# Patient Record
Sex: FEMALE | Race: WHITE | NOT HISPANIC OR LATINO | Employment: FULL TIME | URBAN - METROPOLITAN AREA
[De-identification: names, ages, dates, MRNs, and addresses within clinical notes are randomized per-mention and may not be internally consistent; named-entity substitution may affect disease eponyms.]

---

## 2017-11-24 ENCOUNTER — THERAPY VISIT (OUTPATIENT)
Dept: PHYSICAL THERAPY | Facility: CLINIC | Age: 27
End: 2017-11-24
Payer: COMMERCIAL

## 2017-11-24 DIAGNOSIS — M25.562 BILATERAL KNEE PAIN: Primary | ICD-10-CM

## 2017-11-24 DIAGNOSIS — M25.561 BILATERAL KNEE PAIN: Primary | ICD-10-CM

## 2017-11-24 PROCEDURE — 97110 THERAPEUTIC EXERCISES: CPT | Mod: GP | Performed by: PHYSICAL THERAPIST

## 2017-11-24 PROCEDURE — 97161 PT EVAL LOW COMPLEX 20 MIN: CPT | Mod: GP | Performed by: PHYSICAL THERAPIST

## 2017-11-24 ASSESSMENT — ACTIVITIES OF DAILY LIVING (ADL)
RISE FROM A CHAIR: NOT ANSWERED
PAIN: THE SYMPTOM AFFECTS MY ACTIVITY SEVERELY
KNEEL ON THE FRONT OF YOUR KNEE: NOT ANSWERED
KNEE_ACTIVITY_OF_DAILY_LIVING_SUM: 18
SWELLING: THE SYMPTOM AFFECTS MY ACTIVITY SLIGHTLY
STAND: NOT ANSWERED
GO DOWN STAIRS: NOT ANSWERED
HOW_WOULD_YOU_RATE_THE_OVERALL_FUNCTION_OF_YOUR_KNEE_DURING_YOUR_USUAL_DAILY_ACTIVITIES?: NOT ANSWERED
AS_A_RESULT_OF_YOUR_KNEE_INJURY,_HOW_WOULD_YOU_RATE_YOUR_CURRENT_LEVEL_OF_DAILY_ACTIVITY?: NOT ANSWERED
WALK: NOT ANSWERED
WEAKNESS: I HAVE THE SYMPTOM BUT IT DOES NOT AFFECT MY ACTIVITY
GO UP STAIRS: NOT ANSWERED
LIMPING: I HAVE THE SYMPTOM BUT IT DOES NOT AFFECT MY ACTIVITY
SQUAT: NOT ANSWERED
STIFFNESS: I HAVE THE SYMPTOM BUT IT DOES NOT AFFECT MY ACTIVITY
SIT WITH YOUR KNEE BENT: NOT ANSWERED
GIVING WAY, BUCKLING OR SHIFTING OF KNEE: THE SYMPTOM AFFECTS MY ACTIVITY SLIGHTLY
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
## 2017-11-24 NOTE — MR AVS SNAPSHOT
"              After Visit Summary   11/24/2017    Arabella Friend    MRN: [id number]           Patient Information     Date Of Birth          1990        Visit Information        Provider Department      11/24/2017 5:20 PM Lee Zurita PT [facility]        Today's Diagnoses     Bilateral knee pain    -  1       Follow-ups after your visit        Your next 10 appointments already scheduled     Dec 01, 2017  5:20 PM CST   JORGE Extremity with Lee Zurita PT   [facility] ([facility]  )    [address] [place] [address] [phone]            Dec 08, 2017  5:20 PM CST   JORGE Extremity with Lee Zurita PT   [facility] ([address] [address] [phone]              Who to contact     If you have questions or need follow up information about today's clinic visit or your schedule please contact [facility] directly at [phone].  Normal or non-critical lab and imaging results will be communicated to you by [employer], letter or phone within 4 business days after the clinic has received the results. If you do not hear from us within 7 days, please contact the clinic through [employer] or phone. If you have a critical or abnormal lab result, we will notify you by phone as soon as possible.  Submit refill requests through [employer] or call your pharmacy and they will forward the refill request to us. Please allow 3 business days for your refill to be completed.          Additional Information About Your Visit        Shoptiqueshart Information     [employer] lets you send messages to your doctor, view your test results, renew your prescriptions, schedule appointments and more. To sign up, go to [email or web address] . Click on \"Log in\" on the left side of the " "screen, which will take you to the Welcome page. Then click on \"Sign up Now\" on the right side of the page.     You will be asked to enter the access code listed below, as well as some personal information. Please follow the directions to create your username and password.     Your access code is: [email or web address]  Expires: 2018  5:53 PM     Your access code will  in 90 days. If you need help or a new code, please call your [place] clinic or [phone].        Care EveryWhere ID     This is your Care EveryWhere ID. This could be used by other organizations to access your [place] medical records  [id number]         Blood Pressure from Last 3 Encounters:   No data found for BP    Weight from Last 3 Encounters:   No data found for Wt              We Performed the Following     HC PT EVAL, LOW COMPLEXITY     JORGE INITIAL EVAL REPORT     THERAPEUTIC EXERCISES        Primary Care Provider    None Specified       No primary provider on file.        Equal Access to Services     [facility]: Hadii peewee Garsia, wagerardo hay, qasanchezta kaalmada rich, edwar napier . So [facility] [phone].    ATENCIÓN: Si habla español, tiene a cheung disposición servicios gratuitos de asistencia lingüística. Llame al [phone].    We comply with applicable federal civil rights laws and [place] laws. We do not discriminate on the basis of race, color, national origin, age, disability, sex, sexual orientation, or gender identity.            Thank you!     Thank you for choosing [facility] THERAP[facility]  for your care. Our goal is always to provide you with excellent care. Hearing back from our patients is one way we can continue to improve our services. Please take a few minutes to complete the written survey that you may receive in the mail after your visit with us. Thank you!             Your Updated Medication List - Protect others around you: Learn " how to safely use, store and throw away your medicines at www.disposemymeds.org.      Notice  As of 11/24/2017  5:53 PM    You have not been prescribed any medications.

## 2017-12-01 ENCOUNTER — THERAPY VISIT (OUTPATIENT)
Dept: PHYSICAL THERAPY | Facility: CLINIC | Age: 27
End: 2017-12-01
Payer: COMMERCIAL

## 2017-12-01 DIAGNOSIS — M25.561 BILATERAL KNEE PAIN: Primary | ICD-10-CM

## 2017-12-01 DIAGNOSIS — M25.562 BILATERAL KNEE PAIN: Primary | ICD-10-CM

## 2017-12-01 PROCEDURE — 97110 THERAPEUTIC EXERCISES: CPT | Mod: GP | Performed by: PHYSICAL THERAPIST

## 2017-12-01 PROCEDURE — 97112 NEUROMUSCULAR REEDUCATION: CPT | Mod: GP | Performed by: PHYSICAL THERAPIST

## 2017-12-01 PROCEDURE — 97140 MANUAL THERAPY 1/> REGIONS: CPT | Mod: GP | Performed by: PHYSICAL THERAPIST

## 2017-12-26 ENCOUNTER — THERAPY VISIT (OUTPATIENT)
Dept: PHYSICAL THERAPY | Facility: CLINIC | Age: 27
End: 2017-12-26
Payer: COMMERCIAL

## 2017-12-26 DIAGNOSIS — M25.562 BILATERAL KNEE PAIN: ICD-10-CM

## 2017-12-26 DIAGNOSIS — M25.561 BILATERAL KNEE PAIN: ICD-10-CM

## 2017-12-26 PROCEDURE — 97110 THERAPEUTIC EXERCISES: CPT | Mod: GP | Performed by: PHYSICAL THERAPIST

## 2018-01-12 ENCOUNTER — THERAPY VISIT (OUTPATIENT)
Dept: PHYSICAL THERAPY | Facility: CLINIC | Age: 28
End: 2018-01-12
Payer: COMMERCIAL

## 2018-01-12 DIAGNOSIS — M25.561 BILATERAL KNEE PAIN: ICD-10-CM

## 2018-01-12 DIAGNOSIS — M25.562 BILATERAL KNEE PAIN: ICD-10-CM

## 2018-01-12 PROCEDURE — 97112 NEUROMUSCULAR REEDUCATION: CPT | Mod: GP | Performed by: PHYSICAL THERAPIST

## 2018-01-12 PROCEDURE — 97110 THERAPEUTIC EXERCISES: CPT | Mod: GP | Performed by: PHYSICAL THERAPIST

## 2018-03-02 ENCOUNTER — THERAPY VISIT (OUTPATIENT)
Dept: PHYSICAL THERAPY | Facility: CLINIC | Age: 28
End: 2018-03-02
Payer: COMMERCIAL

## 2018-03-02 DIAGNOSIS — M25.561 BILATERAL KNEE PAIN: ICD-10-CM

## 2018-03-02 DIAGNOSIS — M25.562 BILATERAL KNEE PAIN: ICD-10-CM

## 2018-03-02 PROCEDURE — 97112 NEUROMUSCULAR REEDUCATION: CPT | Mod: GP | Performed by: PHYSICAL THERAPIST

## 2018-03-02 PROCEDURE — 97110 THERAPEUTIC EXERCISES: CPT | Mod: GP | Performed by: PHYSICAL THERAPIST

## 2020-11-17 ENCOUNTER — HOSPITAL ENCOUNTER (EMERGENCY)
Facility: CLINIC | Age: 30
Discharge: HOME | End: 2020-11-17
Attending: EMERGENCY MEDICINE | Admitting: EMERGENCY MEDICINE
Payer: OTHER MISCELLANEOUS

## 2020-11-17 ENCOUNTER — APPOINTMENT (OUTPATIENT)
Dept: GENERAL RADIOLOGY | Facility: CLINIC | Age: 30
End: 2020-11-17
Attending: EMERGENCY MEDICINE
Payer: OTHER MISCELLANEOUS

## 2020-11-17 VITALS
TEMPERATURE: 98 F | BODY MASS INDEX: 33.32 KG/M2 | DIASTOLIC BLOOD PRESSURE: 91 MMHG | OXYGEN SATURATION: 98 % | RESPIRATION RATE: 20 BRPM | WEIGHT: 200 LBS | SYSTOLIC BLOOD PRESSURE: 159 MMHG | HEIGHT: 65 IN | HEART RATE: 134 BPM

## 2020-11-17 DIAGNOSIS — T59.811A SMOKE INHALATION: ICD-10-CM

## 2020-11-17 DIAGNOSIS — T23.209A PARTIAL THICKNESS BURN OF HAND, UNSPECIFIED LATERALITY, UNSPECIFIED SITE OF HAND, INITIAL ENCOUNTER: ICD-10-CM

## 2020-11-17 PROCEDURE — 99283 EMERGENCY DEPT VISIT LOW MDM: CPT | Mod: 25

## 2020-11-17 PROCEDURE — 250N000013 HC RX MED GY IP 250 OP 250 PS 637: Performed by: EMERGENCY MEDICINE

## 2020-11-17 PROCEDURE — 250N000009 HC RX 250: Performed by: EMERGENCY MEDICINE

## 2020-11-17 PROCEDURE — 71046 X-RAY EXAM CHEST 2 VIEWS: CPT

## 2020-11-17 RX ORDER — IBUPROFEN 600 MG/1
600 TABLET, FILM COATED ORAL ONCE
Status: COMPLETED | OUTPATIENT
Start: 2020-11-17 | End: 2020-11-17

## 2020-11-17 RX ORDER — ALBUTEROL SULFATE 90 UG/1
2 POWDER, METERED RESPIRATORY (INHALATION) EVERY 6 HOURS PRN
Qty: 1 INHALER | Refills: 0 | Status: SHIPPED | OUTPATIENT
Start: 2020-11-17 | End: 2022-12-15

## 2020-11-17 RX ORDER — SILVER SULFADIAZINE 10 MG/G
1 CREAM TOPICAL DAILY
Status: DISCONTINUED | OUTPATIENT
Start: 2020-11-17 | End: 2020-11-17 | Stop reason: HOSPADM

## 2020-11-17 RX ORDER — SILVER SULFADIAZINE 10 MG/G
CREAM TOPICAL
Qty: 400 G | Refills: 1 | Status: SHIPPED | OUTPATIENT
Start: 2020-11-17 | End: 2022-12-15

## 2020-11-17 RX ADMIN — IBUPROFEN 600 MG: 600 TABLET ORAL at 14:14

## 2020-11-17 RX ADMIN — SILVER SULFADIAZINE 1 G: 10 CREAM TOPICAL at 14:29

## 2020-11-17 ASSESSMENT — ENCOUNTER SYMPTOMS: COUGH: 1

## 2020-11-17 ASSESSMENT — MIFFLIN-ST. JEOR: SCORE: 1633.07
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
## 2020-12-20 ENCOUNTER — HEALTH MAINTENANCE LETTER (OUTPATIENT)
Age: 30
End: 2020-12-20

## 2021-10-03 ENCOUNTER — HEALTH MAINTENANCE LETTER (OUTPATIENT)
Age: 31
End: 2021-10-03

## 2022-01-13 ENCOUNTER — OFFICE VISIT (OUTPATIENT)
Dept: URGENT CARE | Facility: URGENT CARE | Age: 32
End: 2022-01-13
Payer: COMMERCIAL

## 2022-01-13 VITALS
HEART RATE: 102 BPM | TEMPERATURE: 99.2 F | BODY MASS INDEX: 34.31 KG/M2 | WEIGHT: 206.2 LBS | DIASTOLIC BLOOD PRESSURE: 71 MMHG | OXYGEN SATURATION: 97 % | SYSTOLIC BLOOD PRESSURE: 107 MMHG

## 2022-01-13 DIAGNOSIS — J01.90 ACUTE SINUSITIS WITH SYMPTOMS > 10 DAYS: Primary | ICD-10-CM

## 2022-01-13 PROCEDURE — 99203 OFFICE O/P NEW LOW 30 MIN: CPT | Mod: 25 | Performed by: PHYSICIAN ASSISTANT

## 2022-01-13 PROCEDURE — 96372 THER/PROPH/DIAG INJ SC/IM: CPT | Performed by: PHYSICIAN ASSISTANT

## 2022-01-13 RX ORDER — ETONOGESTREL AND ETHINYL ESTRADIOL VAGINAL RING .015; .12 MG/D; MG/D
1 RING VAGINAL EVERY 24 HOURS
COMMUNITY
End: 2022-01-13

## 2022-01-13 RX ORDER — CEFTRIAXONE SODIUM 1 G
2 VIAL (EA) INJECTION ONCE
Status: COMPLETED | OUTPATIENT
Start: 2022-01-13 | End: 2022-01-13

## 2022-01-13 RX ORDER — SERTRALINE HYDROCHLORIDE 100 MG/1
100 TABLET, FILM COATED ORAL
COMMUNITY

## 2022-01-13 RX ORDER — HYDROXYZINE HYDROCHLORIDE 25 MG/1
25 TABLET, FILM COATED ORAL
COMMUNITY

## 2022-01-13 RX ADMIN — Medication 2 G: at 15:49

## 2022-01-13 ASSESSMENT — ENCOUNTER SYMPTOMS
DIZZINESS: 0
FEVER: 0
SHORTNESS OF BREATH: 0
BACK PAIN: 0
RHINORRHEA: 0
VOMITING: 0
NECK STIFFNESS: 0
CARDIOVASCULAR NEGATIVE: 1
COUGH: 1
NECK PAIN: 0
EYES NEGATIVE: 1
ENDOCRINE NEGATIVE: 1
SINUS PAIN: 1
PALPITATIONS: 0
LIGHT-HEADEDNESS: 0
WOUND: 0
JOINT SWELLING: 0
HEADACHES: 0
SORE THROAT: 0
SINUS PRESSURE: 1
DIARRHEA: 0
MUSCULOSKELETAL NEGATIVE: 1
NAUSEA: 0
WEAKNESS: 0
ALLERGIC/IMMUNOLOGIC NEGATIVE: 1
ARTHRALGIAS: 0
CHILLS: 0
MYALGIAS: 0

## 2022-01-13 NOTE — PROGRESS NOTES
Chief Complaint:     Chief Complaint   Patient presents with     RECHECK     seen 1/8/21; no improvement with symptoms after 5 days of antibiotics       No results found for any visits on 01/13/22.    Medical Decision Making:    Vital signs reviewed by Liang Montgomery PA-C  /71   Pulse 102   Temp 99.2  F (37.3  C) (Tympanic)   Wt 93.5 kg (206 lb 3.2 oz)   SpO2 97%   BMI 34.31 kg/m      Differential Diagnosis:  URI Adult/Peds:  Acute left otitis media, Bronchitis-viral, Influenza, Pneumonia, Sinusitis, Strep pharyngitis, Tonsilitis, Viral pharyngitis, Viral syndrome and Viral upper respiratory illness        ASSESSMENT    1. Acute sinusitis with symptoms > 10 days        PLAN    Patient is in no acute distress.    Temp is 99.2 in clinic today, lung sounds were clear, and O2 sats at 97% on RA.    With ongoing symptoms, Patient given 2 G Rocephin IM in clinic today and order placed for ENT referral.  Continue Augmentin until finished.   Rest, Push fluids, vaporizer, elevation of head of bed.  Ibuprofen and or Tylenol for any fever or body aches.  Over the counter cough suppressant- PRN- as discussed.   Worrisome symptoms discussed with instructions to go to the ED.  Patient verbalized understanding and agreed with this plan.    Labs:    No results found for any visits on 01/13/22.     Vital signs reviewed by Liang Montgomery PA-C  /71   Pulse 102   Temp 99.2  F (37.3  C) (Tympanic)   Wt 93.5 kg (206 lb 3.2 oz)   SpO2 97%   BMI 34.31 kg/m      Current Meds      Current Outpatient Medications:      amoxicillin-clavulanate (AUGMENTIN) 875-125 MG tablet, Take 1 tablet by mouth, Disp: , Rfl:      albuterol (PROAIR RESPICLICK) 108 (90 Base) MCG/ACT inhaler, Inhale 2 puffs into the lungs every 6 hours as needed for shortness of breath / dyspnea or wheezing (Patient not taking: Reported on 1/13/2022), Disp: 1 Inhaler, Rfl: 0     hydrOXYzine (ATARAX) 25 MG tablet, Take 25 mg by mouth, Disp: , Rfl:       sertraline (ZOLOFT) 100 MG tablet, Take 100 mg by mouth, Disp: , Rfl:      silver sulfADIAZINE (SILVADENE) 1 % external cream, Apply to burns BID x 7 days (Patient not taking: Reported on 1/13/2022), Disp: 400 g, Rfl: 1    Current Facility-Administered Medications:      cefTRIAXone (ROCEPHIN) injection 2 g, 2 g, Intramuscular, Once, Liang Montgomery PA-C      Respiratory History    occasional episodes of bronchitis      SUBJECTIVE    HPI: Arabella Friend is an 31 year old female who presents with chest congestion, cough nonproductive, occasional, ear pain left, facial pain/pressure and nasal congestion.  Symptoms began 3  weeks ago and has unchanged. Patient was seen on 1/8 2022 for same and started on Augmentin for 10 days for L otitis media.  COVID testing was negative at this visit.  She states that symptoms have not been improving since starting the Augmentin.  There is no shortness of breath, wheezing and chest pain.  Patient is eating and drinking well.  No fever, nausea, vomiting, or diarrhea.    Patient denies any recent travel or exposure to known COVID positive tested individual.      Patient is new to [facility].    ROS:     Review of Systems   Constitutional: Negative for chills and fever.   HENT: Positive for congestion, ear pain, sinus pressure and sinus pain. Negative for rhinorrhea and sore throat.    Eyes: Negative.    Respiratory: Positive for cough. Negative for shortness of breath.    Cardiovascular: Negative.  Negative for chest pain and palpitations.   Gastrointestinal: Negative for diarrhea, nausea and vomiting.   Endocrine: Negative.    Genitourinary: Negative.    Musculoskeletal: Negative.  Negative for arthralgias, back pain, joint swelling, myalgias, neck pain and neck stiffness.   Skin: Negative.  Negative for rash and wound.   Allergic/Immunologic: Negative.  Negative for immunocompromised state.   Neurological: Negative for dizziness, weakness, light-headedness and headaches.          Family History   No family history on file.     Problem history  Patient Active Problem List   Diagnosis     Knee pain     Edema     Bilateral knee pain        Allergies  Allergies   Allergen Reactions     Latex      Vicodin [Hydrocodone-Acetaminophen]         Social History  Social History     Socioeconomic History     Marital status: Single     Spouse name: Not on file     Number of children: Not on file     Years of education: Not on file     Highest education level: Not on file   Occupational History     Not on file   Tobacco Use     Smoking status: Current Some Day Smoker     Smokeless tobacco: Not on file   Substance and Sexual Activity     Alcohol use: Yes     Drug use: Never     Sexual activity: Not on file   Other Topics Concern     Not on file   Social History Narrative     Not on file     Social Determinants of Health     Financial Resource Strain: Not on file   Food Insecurity: Not on file   Transportation Needs: Not on file   Physical Activity: Not on file   Stress: Not on file   Social Connections: Not on file   Intimate Partner Violence: Not on file   Housing Stability: Not on file        OBJECTIVE     Vital signs reviewed by Liang Montgomery PA-C  /71   Pulse 102   Temp 99.2  F (37.3  C) (Tympanic)   Wt 93.5 kg (206 lb 3.2 oz)   SpO2 97%   BMI 34.31 kg/m       Physical Exam  Vitals and nursing note reviewed.   Constitutional:       General: She is not in acute distress.     Appearance: She is well-developed. She is not ill-appearing, toxic-appearing or diaphoretic.   HENT:      Head: Normocephalic and atraumatic.      Right Ear: Hearing, tympanic membrane, ear canal and external ear normal. No drainage, swelling or tenderness. Tympanic membrane is not perforated, erythematous, retracted or bulging.      Left Ear: Hearing, tympanic membrane, ear canal and external ear normal. No drainage, swelling or tenderness. Tympanic membrane is not perforated, erythematous, retracted or  bulging.      Nose: Congestion present. No mucosal edema or rhinorrhea.      Right Sinus: Maxillary sinus tenderness and frontal sinus tenderness present.      Left Sinus: Maxillary sinus tenderness and frontal sinus tenderness present.      Mouth/Throat:      Pharynx: Posterior oropharyngeal erythema present. No pharyngeal swelling, oropharyngeal exudate or uvula swelling.      Tonsils: No tonsillar exudate or tonsillar abscesses. 0 on the right. 0 on the left.   Eyes:      General:         Right eye: No discharge.         Left eye: No discharge.      Pupils: Pupils are equal, round, and reactive to light.   Cardiovascular:      Rate and Rhythm: Normal rate and regular rhythm.      Heart sounds: Normal heart sounds. No murmur heard.  No friction rub. No gallop.    Pulmonary:      Effort: Pulmonary effort is normal. No respiratory distress.      Breath sounds: Normal breath sounds. No decreased breath sounds, wheezing, rhonchi or rales.   Chest:      Chest wall: No tenderness.   Abdominal:      General: Bowel sounds are normal. There is no distension.      Palpations: Abdomen is soft. There is no mass.      Tenderness: There is no abdominal tenderness. There is no guarding.   Musculoskeletal:      Cervical back: Normal range of motion and neck supple.   Lymphadenopathy:      Head:      Right side of head: No submental, submandibular, tonsillar, preauricular or posterior auricular adenopathy.      Left side of head: No submental, submandibular, tonsillar, preauricular or posterior auricular adenopathy.      Cervical: No cervical adenopathy.      Right cervical: No superficial or posterior cervical adenopathy.     Left cervical: No superficial or posterior cervical adenopathy.   Skin:     General: Skin is warm and dry.      Findings: No rash.   Neurological:      Mental Status: She is alert and oriented to person, place, and time.      Cranial Nerves: No cranial nerve deficit.      Deep Tendon Reflexes: Reflexes are  normal and symmetric.   Psychiatric:         Behavior: Behavior normal. Behavior is cooperative.         Thought Content: Thought content normal.         Judgment: Judgment normal.           Liang Montgomery PA-C  1/13/2022, 3:17 PM

## 2022-01-13 NOTE — PATIENT INSTRUCTIONS
Patient Education     Sinusitis (Antibiotic Treatment)    The sinuses are air-filled spaces within the bones of the face. They connect to the inside of the nose. Sinusitis is an inflammation of the tissue that lines the sinuses. Sinusitis can occur during a cold. It can also happen due to allergies to pollens and other particles in the air. Sinusitis can cause symptoms of sinus congestion and a feeling of fullness. A sinus infection causes fever, headache, and facial pain. There is often green or yellow fluid draining from the nose or into the back of the throat (post-nasal drip). You have been given antibiotics to treat this condition.   Home care    Take the full course of antibiotics as instructed. Don't stop taking them, even when you feel better.    Drink plenty of water, hot tea, and other liquids as directed by the healthcare provider. This may help thin nasal mucus. It also may help your sinuses drain fluids.    Heat may help soothe painful areas of your face. Use a towel soaked in hot water. Or,  the shower and direct the warm spray onto your face. Using a vaporizer along with a menthol rub at night may also help soothe symptoms.     An expectorant with guaifenesin may help thin nasal mucus and help your sinuses drain fluids. Talk with your provider or pharmacists before taking an over-the-counter (OTC) medicine if you have any questions about it or its side effects..    You can use an OTC decongestant, unless a similar medicine was prescribed to you. Nasal sprays work the fastest. Use one that contains phenylephrine or oxymetazoline. First blow your nose gently. Then use the spray. Don't use these medicines more often than directed on the label. If you do, your symptoms may get worse. You may also take pills that contain pseudoephedrine. Don t use products that combine multiple medicines. This is because side effects may be increased. Read labels. You can also ask the pharmacist for help. (People  with high blood pressure should not use decongestants. They can raise blood pressure.) Talk with your provider or pharmacist if you have any questions about the medicine..    OTC antihistamines may help if allergies contributed to your sinusitis. Talk with your provider or pharmacist if you have any questions about the medicine..    Don't use nasal rinses or irrigation during an acute sinus infection, unless your healthcare provider tells you to. Rinsing may spread the infection to other areas in your sinuses.    Use acetaminophen or ibuprofen to control pain, unless another pain medicine was prescribed to you. If you have chronic liver or kidney disease or ever had a stomach ulcer, talk with your healthcare provider before using these medicines. Never give aspirin to anyone under age 18 who is ill with a fever. It may cause severe liver damage.    Don't smoke. This can make symptoms worse.    Follow-up care  Follow up with your healthcare provider, or as advised.   When to seek medical advice  Call your healthcare provider if any of these occur:     Facial pain or headache that gets worse    Stiff neck    Unusual drowsiness or confusion    Swelling of your forehead or eyelids    Symptoms don't go away in 10 days    Vision problems, such as blurred or double vision    Fever of 100.4 F (38 C) or higher, or as directed by your healthcare provider  Call 911  Call 911 if any of these occur:     Seizure    Trouble breathing    Feeling dizzy or faint    Fingernails, skin or lips look blue, purple , or gray  Prevention  Here are steps you can take to help prevent an infection:     Keep good hand washing habits.    Don t have close contact with people who have sore throats, colds, or other upper respiratory infections.    Don t smoke, and stay away from secondhand smoke.    Stay up to date with of your vaccines.  [employer] last reviewed this educational content on 12/1/2019 2000-2021 [employer]. All rights  reserved. This information is not intended as a substitute for professional medical care. Always follow your healthcare professional's instructions.

## 2022-01-13 NOTE — NURSING NOTE
Clinic Administered Medication Documentation      Injectable Medication Documentation    Patient was given Ceftriaxone Sodium (Rocephin). Prior to medication administration, verified patients identity using patient s name and date of birth. Please see MAR and medication order for additional information. Patient instructed to remain in clinic for 15 minutes.      Was entire vial of medication used? Yes  Vial/Syringe: Single dose vial  Expiration Date:  FEB-2024  Was this medication supplied by the patient? No     Isaias Barclay CMA

## 2022-01-21 ENCOUNTER — NURSE TRIAGE (OUTPATIENT)
Dept: NURSING | Facility: CLINIC | Age: 32
End: 2022-01-21
Payer: COMMERCIAL

## 2022-01-21 ENCOUNTER — OFFICE VISIT (OUTPATIENT)
Dept: URGENT CARE | Facility: URGENT CARE | Age: 32
End: 2022-01-21
Payer: COMMERCIAL

## 2022-01-21 VITALS
TEMPERATURE: 101.1 F | DIASTOLIC BLOOD PRESSURE: 68 MMHG | OXYGEN SATURATION: 96 % | WEIGHT: 205.9 LBS | SYSTOLIC BLOOD PRESSURE: 118 MMHG | HEART RATE: 125 BPM | BODY MASS INDEX: 34.26 KG/M2

## 2022-01-21 DIAGNOSIS — R53.83 TIREDNESS: ICD-10-CM

## 2022-01-21 DIAGNOSIS — R50.9 FEVER, UNSPECIFIED FEVER CAUSE: Primary | ICD-10-CM

## 2022-01-21 DIAGNOSIS — R00.0 TACHYCARDIA: ICD-10-CM

## 2022-01-21 DIAGNOSIS — H66.002 ACUTE SUPPURATIVE OTITIS MEDIA OF LEFT EAR WITHOUT SPONTANEOUS RUPTURE OF TYMPANIC MEMBRANE, RECURRENCE NOT SPECIFIED: ICD-10-CM

## 2022-01-21 LAB
FLUAV AG SPEC QL IA: NEGATIVE
FLUBV AG SPEC QL IA: NEGATIVE
SARS-COV-2 RNA RESP QL NAA+PROBE: NEGATIVE

## 2022-01-21 PROCEDURE — U0003 INFECTIOUS AGENT DETECTION BY NUCLEIC ACID (DNA OR RNA); SEVERE ACUTE RESPIRATORY SYNDROME CORONAVIRUS 2 (SARS-COV-2) (CORONAVIRUS DISEASE [COVID-19]), AMPLIFIED PROBE TECHNIQUE, MAKING USE OF HIGH THROUGHPUT TECHNOLOGIES AS DESCRIBED BY CMS-2020-01-R: HCPCS | Performed by: PHYSICIAN ASSISTANT

## 2022-01-21 PROCEDURE — 93000 ELECTROCARDIOGRAM COMPLETE: CPT | Performed by: PHYSICIAN ASSISTANT

## 2022-01-21 PROCEDURE — 99215 OFFICE O/P EST HI 40 MIN: CPT | Performed by: PHYSICIAN ASSISTANT

## 2022-01-21 PROCEDURE — U0005 INFEC AGEN DETEC AMPLI PROBE: HCPCS | Performed by: PHYSICIAN ASSISTANT

## 2022-01-21 PROCEDURE — 87804 INFLUENZA ASSAY W/OPTIC: CPT | Performed by: PHYSICIAN ASSISTANT

## 2022-01-21 ASSESSMENT — PAIN SCALES - GENERAL: PAINLEVEL: NO PAIN (0)

## 2022-01-21 NOTE — PATIENT INSTRUCTIONS
Persistent left otitis media despite Augmentin and 2 shots of Rocephin.  Now with fever and tachycardia.  Extremely tired.  Limited on labs and imaging here.  To ER for sepsis work-up.  COVID and influenza test pending.

## 2022-01-21 NOTE — PROGRESS NOTES
Chief Complaint   Patient presents with     URI     Otalgia     Generalized Body Aches     Results for orders placed or performed in visit on 01/21/22   Influenza A & B Antigen - Clinic Collect     Status: Normal    Specimen: Nose; Swab   Result Value Ref Range    Influenza A antigen Negative Negative    Influenza B antigen Negative Negative    Narrative    Test results must be correlated with clinical data. If necessary, results should be confirmed by a molecular assay or viral culture.       EKG-see below plan            ASSESSMENT:     ICD-10-CM    1. Fever, unspecified fever cause  R50.9 Symptomatic; Yes; 1/21/2022 COVID-19 Virus (Coronavirus) by PCR Nose     Influenza A & B Antigen - Clinic Collect     EKG 12-lead complete w/read - Clinics   2. Tachycardia  R00.0 EKG 12-lead complete w/read - Clinics   3. Acute suppurative otitis media of left ear without spontaneous rupture of tympanic membrane, recurrence not specified  H66.002    4. Tiredness  R53.83 Symptomatic; Yes; 1/21/2022 COVID-19 Virus (Coronavirus) by PCR Nose     Influenza A & B Antigen - Clinic Collect           PLAN: Persistent left otitis media despite course of Augmentin and 2 shots of IM Rocephin.  Now with fever and tachycardia today.  Negative influenza.  EKG rate 113.  Nonspecific ST depression in aVR this, V5 and V6.  No prior EKG for comparison.  To ER for sepsis work-up.  Limited on labs and imaging. ?  Mastoiditis which requires IV antibiotics.  I have discussed clinical findings with patient.  All questions are answered, patient indicates understanding of these issues and is in agreement with plan.   Patient care instructions are discussed/given at the end of visit.       Yudith Brito PA-C      SUBJECTIVE:  31-year-old female presents for persistent left ear infection over the past couple weeks.  2 prior urgent care visits.  Took a course of Augmentin and 2 IM Rocephin shots without resolution.  Now today with fever and racing  heart.  Feels extremely tired.  Cough, stuffy nose, generalized body aches.  Denies chest pain or shortness of breath.  Mirena and birth control.    Allergies   Allergen Reactions     Codeine Nausea and Vomiting     Latex      Vicodin [Hydrocodone-Acetaminophen]        No past medical history on file.    albuterol (PROAIR RESPICLICK) 108 (90 Base) MCG/ACT inhaler, Inhale 2 puffs into the lungs every 6 hours as needed for shortness of breath / dyspnea or wheezing (Patient not taking: Reported on 1/13/2022)  hydrOXYzine (ATARAX) 25 MG tablet, Take 25 mg by mouth  sertraline (ZOLOFT) 100 MG tablet, Take 100 mg by mouth  silver sulfADIAZINE (SILVADENE) 1 % external cream, Apply to burns BID x 7 days (Patient not taking: Reported on 1/13/2022)    No current facility-administered medications on file prior to visit.      Social History     Tobacco Use     Smoking status: Never Smoker     Smokeless tobacco: Never Used   Substance Use Topics     Alcohol use: Yes       ROS:  CONSTITUTIONAL: Negative for fatigue or fever.  EYES: Negative for eye problems.  ENT: As above.  RESP: As above.  CV: Negative for chest pains.  GI: Negative for vomiting.  MUSCULOSKELETAL:  Negative for significant muscle or joint pains.  NEUROLOGIC: Negative for headaches.  SKIN: Negative for rash.  PSYCH: Normal mentation for age.    OBJECTIVE:  /68 (BP Location: Left arm, Patient Position: Chair, Cuff Size: Adult Regular)   Pulse (!) 125   Temp (!) 101.1  F (38.4  C)   Wt 93.4 kg (205 lb 14.4 oz)   SpO2 96%   BMI 34.26 kg/m    GENERAL APPEARANCE: Healthy, alert and no distress.  EYES:Conjunctiva/sclera clear.  EARS: No cerumen.   Ear canals w/o erythema.  Right TM is translucent.  Right TM is dull and yellow and red.mild mastoid tenderness     NOSE/MOUTH: Nose without ulcers, erythema or lesions.  SINUSES: No maxillary sinus tenderness.  THROAT: No erythema w/o tonsillar enlargement . No exudates.  NECK: Supple, tender in the left cervical  chain   RESP: Lungs clear to auscultation - no rales, rhonchi or wheezes  CV: Regular rate and rhythm, normal S1 S2, no murmur noted.  NEURO: Awake, alert    SKIN: No rashes    Yudith Brito PA-C

## 2022-01-21 NOTE — TELEPHONE ENCOUNTER
RN triage   Call from pt   Pt seen at [facility] [place] -- 1/13 -- sinus and L ear infection   Got rocephin injections and oral antibx ---   Complete oral antibx on 1/17   Still has L ear pain and swelling behind/under ear / along jaw   Still some yellow ear drainage   No fever     Negative home covid test yesterday     No cough or diff breathing     Reviewed home care advice     Pt does not have PCP   Advised to ve seen -- will go back to [facility] [place]     Susana Castro RN  [facility]  Triage Nurse Advisor      COVID 19 Nurse Triage Plan/Patient Instructions    Please be aware that novel coronavirus (COVID-19) may be circulating in the community. If you develop symptoms such as fever, cough, or SOB or if you have concerns about the presence of another infection including coronavirus (COVID-19), please contact your health care provider or visit [email or web address].     Disposition/Instructions    In-Person Visit with provider recommended. Reference Visit Selection Guide.    Thank you for taking steps to prevent the spread of this virus.  o Limit your contact with others.  o Wear a simple mask to cover your cough.  o Wash your hands well and often.    Resources    [facility]: About COVID-19: [email or web address]    CDC: What to Do If You're Sick: www.cdc.gov/coronavirus/2019-ncov/about/steps-when-sick.html    CDC: Ending Home Isolation: www.cdc.gov/coronavirus/2019-ncov/hcp/disposition-in-home-patients.html     CDC: Caring for Someone: www.cdc.gov/coronavirus/2019-ncov/if-you-are-sick/care-for-someone.html     [facility]: Interim Guidance for Hospital Discharge to Home: www.[email or web address].[facility].[place]/diseases/coronavirus/hcp/hospdischarge.pdf    [facility] clinical trials (COVID-19 research studies): [email or web address]     Below are the COVID-19 hotlines at the [place] Department of Health ([facility]). Interpreters are available.   o For health questions: Call 073-965-3392 or 1-880.275.7182 (7 a.m.  to 7 p.m.)  o For questions about schools and childcare: Call 030-086-9386 or 1-316.557.6818 (7 a.m. to 7 p.m.)                     Reason for Disposition    Pink or red swelling behind the ear    Protocols used: EARACHE-A-[place]

## 2022-01-23 ENCOUNTER — HEALTH MAINTENANCE LETTER (OUTPATIENT)
Age: 32
End: 2022-01-23

## 2022-03-10 ENCOUNTER — OFFICE VISIT (OUTPATIENT)
Dept: OTOLARYNGOLOGY | Facility: CLINIC | Age: 32
End: 2022-03-10
Payer: COMMERCIAL

## 2022-03-10 VITALS
SYSTOLIC BLOOD PRESSURE: 114 MMHG | HEART RATE: 83 BPM | DIASTOLIC BLOOD PRESSURE: 80 MMHG | WEIGHT: 205 LBS | BODY MASS INDEX: 34.11 KG/M2

## 2022-03-10 DIAGNOSIS — Z96.22 STATUS POST MYRINGOTOMY WITH INSERTION OF TUBE: Primary | ICD-10-CM

## 2022-03-10 DIAGNOSIS — M26.609 TMJ (TEMPOROMANDIBULAR JOINT SYNDROME): ICD-10-CM

## 2022-03-10 DIAGNOSIS — H92.02 OTALGIA, LEFT: ICD-10-CM

## 2022-03-10 PROCEDURE — 99213 OFFICE O/P EST LOW 20 MIN: CPT | Performed by: OTOLARYNGOLOGY

## 2022-03-10 NOTE — Clinical Note
3/10/2022         RE: Arabella Friend  [address] [facility] [id number]        Dear Colleague,    Thank you for referring your patient, Arabella Friend, to the [facility]. Please see a copy of my visit note below.    ENT Consultation    Arabella Friend is a 31 year old female who is seen in consultation at the request of self.      History of Present Illness - Arabella Friend is a 31 year old female presents for evaluation of her left ear.  Patient was previously seen by me couple months ago at [facility] for acute mastoiditis at that time had myringotomy during the centesis tube placement.  She was doing well but then had some discomfort in her ear was seen by another ear nose and throat doctor.  Had a bit of drainage that was suctioned.  She was not started on any medications according to the patient.  She had a CT scan of the chest showed little bit of peripheral mastoid opacification but the antrum was clear so was the middle ear space.  Still has some pain but pain is mostly below the ear around the ear can be sharp nothing from the ear itself.  There has been no discharge.  She feels her hearing has not changed and has been fine.      Past Medical History - History reviewed. No pertinent past medical history.    Current Medications -   Current Outpatient Medications:      hydrOXYzine (ATARAX) 25 MG tablet, Take 25 mg by mouth, Disp: , Rfl:      sertraline (ZOLOFT) 100 MG tablet, Take 100 mg by mouth, Disp: , Rfl:      albuterol (PROAIR RESPICLICK) 108 (90 Base) MCG/ACT inhaler, Inhale 2 puffs into the lungs every 6 hours as needed for shortness of breath / dyspnea or wheezing (Patient not taking: Reported on 1/13/2022), Disp: 1 Inhaler, Rfl: 0     silver sulfADIAZINE (SILVADENE) 1 % external cream, Apply to burns BID x 7 days (Patient not taking: Reported on 1/13/2022), Disp: 400 g, Rfl: 1    Allergies -   Allergies   Allergen Reactions     Codeine Nausea and Vomiting      Latex      Vicodin [Hydrocodone-Acetaminophen]        Social History -   Social History     Socioeconomic History     Marital status: Single     Spouse name: None     Number of children: None     Years of education: None     Highest education level: None   Occupational History     None   Tobacco Use     Smoking status: Never Smoker     Smokeless tobacco: Never Used   Substance and Sexual Activity     Alcohol use: Yes     Drug use: Never     Sexual activity: None   Other Topics Concern     None   Social History Narrative     None     Social Determinants of Health     Financial Resource Strain: Not on file   Food Insecurity: Not on file   Transportation Needs: Not on file   Physical Activity: Not on file   Stress: Not on file   Social Connections: Not on file   Intimate Partner Violence: Not on file   Housing Stability: Not on file       Family History - History reviewed. No pertinent family history.    Review of Systems - As per HPI and PMHx, otherwise review of system review of the head and neck negative. Otherwise 10+ review systems negative.    Physical Exam  /80   Pulse 83   Wt 93 kg (205 lb)   BMI 34.11 kg/m    BMI: Body mass index is 34.11 kg/m .    General - The patient is well nourished and well developed, and appears to have good nutritional status.  Alert and oriented to person and place, answers questions and cooperates with examination appropriately.    SKIN - No suspicious lesions or rashes.  Respiration - No respiratory distress.  Head and Face - Normocephalic and atraumatic, with no gross asymmetry noted of the contour of the facial features.  The facial nerve is intact, with strong symmetric movements.    Voice and Breathing - The patient was breathing comfortably without the use of accessory muscles. The patients voice was clear and strong, and had appropriate pitch and quality.    Ears - Bilateral pinna and EACs with normal appearing overlying skin.  Right tympanic membrane intact with  good mobility on pneumatic otoscopy. Bony landmarks of the ossicular chain are normal. The tympanic membranes are normal in appearance. No retraction, perforation, or masses.  No fluid or purulence was seen in the external canal or the middle ear.  On the left tympanic membrane appears to be clear Dura-Vent PE tube is in good position appears to be patent.  Canal appears to be clear and dry.  She does have some discomfort around the TMJ area on the left side even though denies history of clenching or bruxism.  She has had some mild manipulations done by her father who is a chiropractic specialist.  That did not help that much.  Heat and ibuprofen does help.    Eyes - Extraocular movements intact.  Sclera were not icteric or injected, conjunctiva were pink and moist.    Mouth - Examination of the oral cavity showed pink, healthy oral mucosa. No lesions or ulcerations noted.  The tongue was mobile and midline, and the dentition were in good condition.      Throat - The walls of the oropharynx were smooth, pink, moist, symmetric, and had no lesions or ulcerations.  The tonsillar pillars and soft palate were symmetric.  The uvula was midline on elevation.    Neck - Normal midline excursion of the laryngotracheal complex during swallowing.  Full range of motion on passive movement.  Palpation of the occipital, submental, submandibular, internal jugular chain, and supraclavicular nodes did not demonstrate any abnormal lymph nodes or masses.  The carotid pulse was palpable bilaterally.  Palpation of the thyroid was soft and smooth, with no nodules or goiter appreciated.  The trachea was mobile and midline.    Nose - External contour is symmetric, no gross deflection or scars.  Nasal mucosa is pink and moist with no abnormal mucus.  The septum was midline and non-obstructive, turbinates of normal size and position.  No polyps, masses, or purulence noted on examination.    Neuro - Nonfocal neuro exam is normal, CN 2 through  12 intact, normal gait and muscle tone.      Performed in clinic today:  No procedures preformed in clinic today      A/P - Arabella CAROLYNN Friend is a 31 year old female with history of a previous mastoiditis appears to be resolving.  Tubes still in.  Musculoskeletal issues around the ear and TMJ discomfort certainly warrants further evaluation and therefore we will send the patient to the [place] [facility] to evaluate that area further.  Patient will follow up with me in 6 months to make sure the tube has extruded.      Willy Jones MD      Again, thank you for allowing me to participate in the care of your patient.        Sincerely,        Willy Jones MD, MD

## 2022-03-11 NOTE — PROGRESS NOTES
ENT Consultation    Arbaella Friend is a 31 year old female who is seen in consultation at the request of self.      History of Present Illness - Arabella Friend is a 31 year old female presents for evaluation of her left ear.  Patient was previously seen by me couple months ago at [facility] for acute mastoiditis at that time had myringotomy during the centesis tube placement.  She was doing well but then had some discomfort in her ear was seen by another ear nose and throat doctor.  Had a bit of drainage that was suctioned.  She was not started on any medications according to the patient.  She had a CT scan of the chest showed little bit of peripheral mastoid opacification but the antrum was clear so was the middle ear space.  Still has some pain but pain is mostly below the ear around the ear can be sharp nothing from the ear itself.  There has been no discharge.  She feels her hearing has not changed and has been fine.      Past Medical History - History reviewed. No pertinent past medical history.    Current Medications -   Current Outpatient Medications:      hydrOXYzine (ATARAX) 25 MG tablet, Take 25 mg by mouth, Disp: , Rfl:      sertraline (ZOLOFT) 100 MG tablet, Take 100 mg by mouth, Disp: , Rfl:      albuterol (PROAIR RESPICLICK) 108 (90 Base) MCG/ACT inhaler, Inhale 2 puffs into the lungs every 6 hours as needed for shortness of breath / dyspnea or wheezing (Patient not taking: Reported on 1/13/2022), Disp: 1 Inhaler, Rfl: 0     silver sulfADIAZINE (SILVADENE) 1 % external cream, Apply to burns BID x 7 days (Patient not taking: Reported on 1/13/2022), Disp: 400 g, Rfl: 1    Allergies -   Allergies   Allergen Reactions     Codeine Nausea and Vomiting     Latex      Vicodin [Hydrocodone-Acetaminophen]        Social History -   Social History     Socioeconomic History     Marital status: Single     Spouse name: None     Number of children: None     Years of education: None     Highest education  level: None   Occupational History     None   Tobacco Use     Smoking status: Never Smoker     Smokeless tobacco: Never Used   Substance and Sexual Activity     Alcohol use: Yes     Drug use: Never     Sexual activity: None   Other Topics Concern     None   Social History Narrative     None     Social Determinants of Health     Financial Resource Strain: Not on file   Food Insecurity: Not on file   Transportation Needs: Not on file   Physical Activity: Not on file   Stress: Not on file   Social Connections: Not on file   Intimate Partner Violence: Not on file   Housing Stability: Not on file       Family History - History reviewed. No pertinent family history.    Review of Systems - As per HPI and PMHx, otherwise review of system review of the head and neck negative. Otherwise 10+ review systems negative.    Physical Exam  /80   Pulse 83   Wt 93 kg (205 lb)   BMI 34.11 kg/m    BMI: Body mass index is 34.11 kg/m .    General - The patient is well nourished and well developed, and appears to have good nutritional status.  Alert and oriented to person and place, answers questions and cooperates with examination appropriately.    SKIN - No suspicious lesions or rashes.  Respiration - No respiratory distress.  Head and Face - Normocephalic and atraumatic, with no gross asymmetry noted of the contour of the facial features.  The facial nerve is intact, with strong symmetric movements.    Voice and Breathing - The patient was breathing comfortably without the use of accessory muscles. The patients voice was clear and strong, and had appropriate pitch and quality.    Ears - Bilateral pinna and EACs with normal appearing overlying skin.  Right tympanic membrane intact with good mobility on pneumatic otoscopy. Bony landmarks of the ossicular chain are normal. The tympanic membranes are normal in appearance. No retraction, perforation, or masses.  No fluid or purulence was seen in the external canal or the middle ear.   On the left tympanic membrane appears to be clear Dura-Vent PE tube is in good position appears to be patent.  Canal appears to be clear and dry.  She does have some discomfort around the TMJ area on the left side even though denies history of clenching or bruxism.  She has had some mild manipulations done by her father who is a chiropractic specialist.  That did not help that much.  Heat and ibuprofen does help.    Eyes - Extraocular movements intact.  Sclera were not icteric or injected, conjunctiva were pink and moist.    Mouth - Examination of the oral cavity showed pink, healthy oral mucosa. No lesions or ulcerations noted.  The tongue was mobile and midline, and the dentition were in good condition.      Throat - The walls of the oropharynx were smooth, pink, moist, symmetric, and had no lesions or ulcerations.  The tonsillar pillars and soft palate were symmetric.  The uvula was midline on elevation.    Neck - Normal midline excursion of the laryngotracheal complex during swallowing.  Full range of motion on passive movement.  Palpation of the occipital, submental, submandibular, internal jugular chain, and supraclavicular nodes did not demonstrate any abnormal lymph nodes or masses.  The carotid pulse was palpable bilaterally.  Palpation of the thyroid was soft and smooth, with no nodules or goiter appreciated.  The trachea was mobile and midline.    Nose - External contour is symmetric, no gross deflection or scars.  Nasal mucosa is pink and moist with no abnormal mucus.  The septum was midline and non-obstructive, turbinates of normal size and position.  No polyps, masses, or purulence noted on examination.    Neuro - Nonfocal neuro exam is normal, CN 2 through 12 intact, normal gait and muscle tone.      Performed in clinic today:  No procedures preformed in clinic today      A/P - Arabella CAROLYNN Friend is a 31 year old female with history of a previous mastoiditis appears to be resolving.  Tubes still in.   Musculoskeletal issues around the ear and TMJ discomfort certainly warrants further evaluation and therefore we will send the patient to the [place] [facility] to evaluate that area further.  Patient will follow up with me in 6 months to make sure the tube has extruded.      Willy Jones MD

## 2022-07-08 ENCOUNTER — TELEPHONE (OUTPATIENT)
Dept: SLEEP MEDICINE | Facility: CLINIC | Age: 32
End: 2022-07-08

## 2022-07-08 DIAGNOSIS — H66.90 OTITIS MEDIA: Primary | ICD-10-CM

## 2022-07-08 RX ORDER — OFLOXACIN 3 MG/ML
SOLUTION/ DROPS OPHTHALMIC
Qty: 10 ML | Refills: 1 | Status: SHIPPED | OUTPATIENT
Start: 2022-07-08

## 2022-07-08 NOTE — TELEPHONE ENCOUNTER
Returned call to pt and let her know about appt.     Fever- yes comes and goes, but was covid + with fever for 3+days  Chills-no  Cough-no  Cold-no  Headache-no  Sneezing-no  Ear pain- Left  Plugged sensation of the ear- no  Change in hearing-dull, not new  Popping/crackling of ears-no  Ringing of the ears-no  Ear drainage- Left- yellow  Sinus pain/pressure- no  Drainage from the nares or down throat-no  Nasal congestion-no  Sensation of nasal obstruction-no  Constant clearing of the throat-no  Sore throat-no  Jaw pain-no  Pain with chewing-no  Grinding of teeth-no  Popping/catching of the jaw-no  Itchy watery eyes-no  Itchy nose or roof of mouth-no    Does not take any nasal sprays or irrigations/rinses.   pharmacy [facility] PRADHAN RN Specialty Triage 7/8/2022 12:39 PM

## 2022-07-08 NOTE — TELEPHONE ENCOUNTER
Health Call Center    Phone Message    May a detailed message be left on voicemail: yes     Reason for Call: Symptoms or Concerns     If patient has red-flag symptoms, warm transfer to triage line    Current symptom or concern: Pt tested positive for covid 2 weeks ago and was experiencing sinus issues, also her tubes came out of her ears and she is now experiencing pain predominantly in her left ear. Pt has upcoming appt with Dr. Jones on 9/22 but would like to be seen sooner if possible per her pcp. Pt is willing to go to any clinic that Dr. Jones sees at or if recommended to see another provider. Please call pt to discuss.    Symptoms have been present for:  2 week(s)    Has patient previously been seen for this? Yes    By : Dr. Foss [facility] [place]     Date: 7/6/22    Are there any new or worsening symptoms? Yes: Ear pain, mostly in the left ear      Action Taken: Message routed to:  [facility] henry ent    Travel Screening: Not Applicable

## 2022-07-13 NOTE — PROGRESS NOTES
History of Present Illness - Arabella Friend is a 31 year old female presents for evaluation of her left ear.  In the past patient had a left myringotomy with tube placement for presentation of acute mastoiditis but was not coalescent.  She did well until about few weeks ago when she contracted COVID infection which was quite severe.  Started having discharge from the ear at that time.  She was recently started on drops that she is still using.  Ear feels little bit more muffled now.  We will schedule some discomfort in the little bit of shooting pain from behind the ear into the neck on the left side.  Denies any vertigo or dizziness tinnitus.  No current discharge.    Past Medical History - No past medical history on file.    Current Medications -   Current Outpatient Medications:      albuterol (PROAIR RESPICLICK) 108 (90 Base) MCG/ACT inhaler, Inhale 2 puffs into the lungs every 6 hours as needed for shortness of breath / dyspnea or wheezing (Patient not taking: Reported on 1/13/2022), Disp: 1 Inhaler, Rfl: 0     hydrOXYzine (ATARAX) 25 MG tablet, Take 25 mg by mouth, Disp: , Rfl:      ofloxacin (OCUFLOX) 0.3 % ophthalmic solution, 5 drops into Left EAR two times daily for 7 days, Disp: 10 mL, Rfl: 1     sertraline (ZOLOFT) 100 MG tablet, Take 100 mg by mouth, Disp: , Rfl:      silver sulfADIAZINE (SILVADENE) 1 % external cream, Apply to burns BID x 7 days (Patient not taking: Reported on 1/13/2022), Disp: 400 g, Rfl: 1    Allergies -   Allergies   Allergen Reactions     Codeine Nausea and Vomiting     Latex      Vicodin [Hydrocodone-Acetaminophen]        Social History -   Social History     Socioeconomic History     Marital status: Single   Tobacco Use     Smoking status: Never Smoker     Smokeless tobacco: Never Used   Substance and Sexual Activity     Alcohol use: Yes     Drug use: Never       Family History - No family history on file.    Review of Systems - As per HPI and PMHx, otherwise review of system  review of the head and neck negative. Otherwise 10+ review systems negative.    Physical Exam  There were no vitals taken for this visit.  BMI: There is no height or weight on file to calculate BMI.    General - The patient is well nourished and well developed, and appears to have good nutritional status.  Alert and oriented to person and place, answers questions and cooperates with examination appropriately.    SKIN - No suspicious lesions or rashes.  Respiration - No respiratory distress.  Head and Face - Normocephalic and atraumatic, with no gross asymmetry noted of the contour of the facial features.  The facial nerve is intact, with strong symmetric movements.    Voice and Breathing - The patient was breathing comfortably without the use of accessory muscles. The patients voice was clear and strong, and had appropriate pitch and quality.    Ears - Bilateral pinna and EACs with normal appearing overlying skin.  Right tympanic membrane intact with good mobility on pneumatic otoscopy. Bony landmarks of the ossicular chain are normal. The tympanic membrane is normal in appearance. No retraction, perforation, or masses.  No fluid or purulence was seen in the external canal or the middle ear.  On the left side Dura-Vent tube appears to be slightly lateralized but still appears in the drum.  Tympanic membrane slightly retracted but no evidence of active fluid noted.    Eyes - Extraocular movements intact.  Sclera were not icteric or injected, conjunctiva were pink and moist.    Mouth - Examination of the oral cavity showed pink, healthy oral mucosa. No lesions or ulcerations noted.  The tongue was mobile and midline, and the dentition were in good condition.      Throat - The walls of the oropharynx were smooth, pink, moist, symmetric, and had no lesions or ulcerations.  The tonsillar pillars and soft palate were symmetric.  The uvula was midline on elevation.    Neck - Normal midline excursion of the laryngotracheal  complex during swallowing.  Full range of motion on passive movement.  Palpation of the occipital, submental, submandibular, internal jugular chain, and supraclavicular nodes did not demonstrate any abnormal lymph nodes or masses.  The carotid pulse was palpable bilaterally.  Palpation of the thyroid was soft and smooth, with no nodules or goiter appreciated.  The trachea was mobile and midline.  Libido postauricular tenderness on the left but more at the level of mastoid tip all the way to sternocleidomastoid down into the neck.  No postauricular erythema or edema.    Nose - External contour is symmetric, no gross deflection or scars.  Nasal mucosa is pink and moist with no abnormal mucus.  The septum was midline and non-obstructive, turbinates of normal size and position.  No polyps, masses, or purulence noted on examination.    Neuro - Nonfocal neuro exam is normal, CN 2 through 12 intact, normal gait and muscle tone.      Performed in clinic today:  Tympanograms were performed bilaterally.  Type a tympanogram normal on the right and type B with high volume on the left.  Also tuning fork testing at 256 Hz [place] lateralizes to the left but AC greater than BC bilaterally.        A/P - [facility] CAROLYNN Friend is a 31 year old female No chief complaint on file.  Patient with a history of recent otitis media.  We encouraged her to finish her drops.  Would like to recheck within 2 months with audiogram and see how she does prior to proceeding to further escalation of work-up if needed      At [facility] next appointment they will need a hearing test.      Willy Jones MD

## 2022-07-14 ENCOUNTER — OFFICE VISIT (OUTPATIENT)
Dept: AUDIOLOGY | Facility: CLINIC | Age: 32
End: 2022-07-14
Payer: COMMERCIAL

## 2022-07-14 ENCOUNTER — OFFICE VISIT (OUTPATIENT)
Dept: OTOLARYNGOLOGY | Facility: CLINIC | Age: 32
End: 2022-07-14

## 2022-07-14 VITALS
HEART RATE: 104 BPM | SYSTOLIC BLOOD PRESSURE: 118 MMHG | WEIGHT: 214 LBS | DIASTOLIC BLOOD PRESSURE: 83 MMHG | BODY MASS INDEX: 35.61 KG/M2

## 2022-07-14 DIAGNOSIS — H92.02 OTALGIA, LEFT: ICD-10-CM

## 2022-07-14 DIAGNOSIS — H92.02 OTALGIA, LEFT: Primary | ICD-10-CM

## 2022-07-14 DIAGNOSIS — H66.92 OTITIS MEDIA TREATED WITH ANTIBIOTICS IN THE PAST 60 DAYS, LEFT: ICD-10-CM

## 2022-07-14 DIAGNOSIS — H69.92 DYSFUNCTION OF LEFT EUSTACHIAN TUBE: Primary | ICD-10-CM

## 2022-07-14 PROCEDURE — 92567 TYMPANOMETRY: CPT

## 2022-07-14 PROCEDURE — 99213 OFFICE O/P EST LOW 20 MIN: CPT | Performed by: OTOLARYNGOLOGY

## 2022-07-14 NOTE — Clinical Note
7/14/2022         RE: Arabella Friend  [address] [facility] [id number]        Dear Colleague,    Thank you for referring your patient, Arabella Friend, to the [facility]. Please see a copy of my visit note below.    History of Present Illness - Arabella Friend is a 31 year old female presents for evaluation of her left ear.  In the past patient had a left myringotomy with tube placement for presentation of acute mastoiditis but was not coalescent.  She did well until about few weeks ago when she contracted COVID infection which was quite severe.  Started having discharge from the ear at that time.  She was recently started on drops that she is still using.  Ear feels little bit more muffled now.  We will schedule some discomfort in the little bit of shooting pain from behind the ear into the neck on the left side.  Denies any vertigo or dizziness tinnitus.  No current discharge.    Past Medical History - No past medical history on file.    Current Medications -   Current Outpatient Medications:      albuterol (PROAIR RESPICLICK) 108 (90 Base) MCG/ACT inhaler, Inhale 2 puffs into the lungs every 6 hours as needed for shortness of breath / dyspnea or wheezing (Patient not taking: Reported on 1/13/2022), Disp: 1 Inhaler, Rfl: 0     hydrOXYzine (ATARAX) 25 MG tablet, Take 25 mg by mouth, Disp: , Rfl:      ofloxacin (OCUFLOX) 0.3 % ophthalmic solution, 5 drops into Left EAR two times daily for 7 days, Disp: 10 mL, Rfl: 1     sertraline (ZOLOFT) 100 MG tablet, Take 100 mg by mouth, Disp: , Rfl:      silver sulfADIAZINE (SILVADENE) 1 % external cream, Apply to burns BID x 7 days (Patient not taking: Reported on 1/13/2022), Disp: 400 g, Rfl: 1    Allergies -   Allergies   Allergen Reactions     Codeine Nausea and Vomiting     Latex      Vicodin [Hydrocodone-Acetaminophen]        Social History -   Social History     Socioeconomic History     Marital status: Single   Tobacco Use     Smoking  status: Never Smoker     Smokeless tobacco: Never Used   Substance and Sexual Activity     Alcohol use: Yes     Drug use: Never       Family History - No family history on file.    Review of Systems - As per HPI and PMHx, otherwise review of system review of the head and neck negative. Otherwise 10+ review systems negative.    Physical Exam  There were no vitals taken for this visit.  BMI: There is no height or weight on file to calculate BMI.    General - The patient is well nourished and well developed, and appears to have good nutritional status.  Alert and oriented to person and place, answers questions and cooperates with examination appropriately.    SKIN - No suspicious lesions or rashes.  Respiration - No respiratory distress.  Head and Face - Normocephalic and atraumatic, with no gross asymmetry noted of the contour of the facial features.  The facial nerve is intact, with strong symmetric movements.    Voice and Breathing - The patient was breathing comfortably without the use of accessory muscles. The patients voice was clear and strong, and had appropriate pitch and quality.    Ears - Bilateral pinna and EACs with normal appearing overlying skin.  Right tympanic membrane intact with good mobility on pneumatic otoscopy. Bony landmarks of the ossicular chain are normal. The tympanic membrane is normal in appearance. No retraction, perforation, or masses.  No fluid or purulence was seen in the external canal or the middle ear.  On the left side Dura-Vent tube appears to be slightly lateralized but still appears in the drum.  Tympanic membrane slightly retracted but no evidence of active fluid noted.    Eyes - Extraocular movements intact.  Sclera were not icteric or injected, conjunctiva were pink and moist.    Mouth - Examination of the oral cavity showed pink, healthy oral mucosa. No lesions or ulcerations noted.  The tongue was mobile and midline, and the dentition were in good condition.      Throat -  The walls of the oropharynx were smooth, pink, moist, symmetric, and had no lesions or ulcerations.  The tonsillar pillars and soft palate were symmetric.  The uvula was midline on elevation.    Neck - Normal midline excursion of the laryngotracheal complex during swallowing.  Full range of motion on passive movement.  Palpation of the occipital, submental, submandibular, internal jugular chain, and supraclavicular nodes did not demonstrate any abnormal lymph nodes or masses.  The carotid pulse was palpable bilaterally.  Palpation of the thyroid was soft and smooth, with no nodules or goiter appreciated.  The trachea was mobile and midline.  Libido postauricular tenderness on the left but more at the level of mastoid tip all the way to sternocleidomastoid down into the neck.  No postauricular erythema or edema.    Nose - External contour is symmetric, no gross deflection or scars.  Nasal mucosa is pink and moist with no abnormal mucus.  The septum was midline and non-obstructive, turbinates of normal size and position.  No polyps, masses, or purulence noted on examination.    Neuro - Nonfocal neuro exam is normal, CN 2 through 12 intact, normal gait and muscle tone.      Performed in clinic today:  Tympanograms were performed bilaterally.  Type a tympanogram normal on the right and type B with high volume on the left.  Also tuning fork testing at 256 Hz [place] lateralizes to the left but AC greater than BC bilaterally.        A/P - Arabella CAROLYNN Friend is a 31 year old female No chief complaint on file.  Patient with a history of recent otitis media.  We encouraged her to finish her drops.  Would like to recheck within 2 months with audiogram and see how she does prior to proceeding to further escalation of work-up if needed      At [facility] next appointment they will need a hearing test.      Willy Jones MD        Again, thank you for allowing me to participate in the care of your patient.        Sincerely,        Willy  MD Robert, MD

## 2022-07-14 NOTE — PROGRESS NOTES
AUDIOLOGY REPORT:    Patient was referred to [facility] from ENT by Dr. Jones for tympanograms only.     Testing:    Otoscopy:   Otoscopic exam indicates ears are clear of cerumen bilaterally     Tympanograms:    RIGHT: normal eardrum mobility     LEFT:   large ear canal volume consistent with patent PE tubes    Discussed results with the patient.     Patient was returned to ENT for follow up.     Jeanette Chandra, CCC-A  Licensed Audiologist  [place] #[id number]    07/14/22

## 2022-09-04 ENCOUNTER — HEALTH MAINTENANCE LETTER (OUTPATIENT)
Age: 32
End: 2022-09-04

## 2022-11-28 ENCOUNTER — TELEPHONE (OUTPATIENT)
Dept: OTOLARYNGOLOGY | Facility: CLINIC | Age: 32
End: 2022-11-28

## 2022-11-28 DIAGNOSIS — J01.00 ACUTE NON-RECURRENT MAXILLARY SINUSITIS: Primary | ICD-10-CM

## 2022-11-28 NOTE — TELEPHONE ENCOUNTER
Spoke to Dr Jones , per provider - sent script for amoxicillin to pharmacy , patient should follow up in a few weeks.    Scheduled patient for 1/4/23 @ 1130 am in ER.   Cancelled FK visit for 1/26/22.     Patient agreed with this plan       Alondra NOLASCO

## 2022-11-28 NOTE — TELEPHONE ENCOUNTER
Spoke with patient.  Left ear has been painful, feeling full, muffled hearing, watery drainage from ear for 5 days.  Past two days also having sinus pressure, green mucus from nose.  Says has had sinus infection in past and feels similar.  No cough, sore throat, body aches.  States her father looked in her ear with otoscope and noted redness and inflammation, said it looks like yeast.  She did try ciprodex drops (5 drops in ear BID) for two days but said it was painful and did not improve symptoms.  [facility] in [place] for pharmacy.    Marina Roger, ERIN

## 2022-11-28 NOTE — TELEPHONE ENCOUNTER
[facility] Call Center    Phone Message    May a detailed message be left on voicemail: yes     Reason for Call: Other: Pt calling stating she's experiencing ear fullness, pain, decreased hearing and feeling off balance. Would like to speak with care team. Please advise.     Action Taken: Other: [facility]    Travel Screening: Not Applicable

## 2022-12-14 ENCOUNTER — TELEPHONE (OUTPATIENT)
Dept: OTOLARYNGOLOGY | Facility: OTHER | Age: 32
End: 2022-12-14

## 2022-12-14 NOTE — TELEPHONE ENCOUNTER
Per Dr. Jones she needs to see Dr. Torito León in [place] as he is a more specialized ENT provider.     Left patient a voicemail to call back to relay information.     Ludy Scruggs RN on 12/14/2022 at 12:53 PM

## 2022-12-14 NOTE — TELEPHONE ENCOUNTER
[facility] Call Center    Phone Message    May a detailed message be left on voicemail: yes     Reason for Call: Symptoms or Concerns     If patient has red-flag symptoms, warm transfer to triage line    Current symptom or concern: Ear Pain - got a little bit better during antibiotic course - only relief pt. Is when she does the cipro drops, but it doesn't last for very long.  Has finished antibiotic course, would like to be seen before trying other antibiotics.      Symptoms have been present for: almost 4 weeks    Are there any new or worsening symptoms? Yes- Worsening      Action Taken: Message routed to:  Other: [facility]    Travel Screening: Not Applicable

## 2022-12-14 NOTE — TELEPHONE ENCOUNTER
Patient rates pain a 7-8/10 in her left ear. She states the course of antibiotics she recently finished did not help. She continues to use Cipro ear drops as prescribed. She is having some clear discharge from that ear.     Due to on going issue and significant pain advised patient to go into . Dr. Jones does not have any openings for a work-in appointment.     Dr. Jones would like patient to see a more specialized provider in [place].     Patient verbalized understanding.    Ludy Scruggs RN on 12/14/2022 at 4:10 PM

## 2022-12-14 NOTE — TELEPHONE ENCOUNTER
Patient called back and said she is in a significant amount of pain and the other ENT that she was referred to can not see her until March 1st. Please call pt back to discuss. Thank you

## 2022-12-15 ENCOUNTER — OFFICE VISIT (OUTPATIENT)
Dept: URGENT CARE | Facility: URGENT CARE | Age: 32
End: 2022-12-15
Payer: COMMERCIAL

## 2022-12-15 VITALS
SYSTOLIC BLOOD PRESSURE: 138 MMHG | TEMPERATURE: 97.6 F | BODY MASS INDEX: 32.22 KG/M2 | HEART RATE: 107 BPM | DIASTOLIC BLOOD PRESSURE: 81 MMHG | WEIGHT: 193.6 LBS | OXYGEN SATURATION: 98 %

## 2022-12-15 DIAGNOSIS — H66.002 ACUTE SUPPURATIVE OTITIS MEDIA OF LEFT EAR WITHOUT SPONTANEOUS RUPTURE OF TYMPANIC MEMBRANE, RECURRENCE NOT SPECIFIED: Primary | ICD-10-CM

## 2022-12-15 PROCEDURE — 96372 THER/PROPH/DIAG INJ SC/IM: CPT | Performed by: PHYSICIAN ASSISTANT

## 2022-12-15 PROCEDURE — 99213 OFFICE O/P EST LOW 20 MIN: CPT | Mod: 25 | Performed by: PHYSICIAN ASSISTANT

## 2022-12-15 RX ORDER — CEFDINIR 300 MG/1
300 CAPSULE ORAL 2 TIMES DAILY
Qty: 28 CAPSULE | Refills: 0 | Status: SHIPPED | OUTPATIENT
Start: 2022-12-15 | End: 2022-12-29

## 2022-12-15 RX ORDER — PREDNISONE 20 MG/1
40 TABLET ORAL DAILY
Qty: 10 TABLET | Refills: 0 | Status: SHIPPED | OUTPATIENT
Start: 2022-12-15 | End: 2022-12-20

## 2022-12-15 RX ORDER — CEFTRIAXONE SODIUM 1 G
1 VIAL (EA) INJECTION ONCE
Status: COMPLETED | OUTPATIENT
Start: 2022-12-15 | End: 2022-12-15

## 2022-12-15 RX ORDER — PHENTERMINE HYDROCHLORIDE 37.5 MG/1
TABLET ORAL
COMMUNITY
Start: 2022-10-27

## 2022-12-15 RX ADMIN — Medication 1 G: at 16:16

## 2022-12-15 ASSESSMENT — ENCOUNTER SYMPTOMS
CHILLS: 0
WOUND: 0
ENDOCRINE NEGATIVE: 1
VOMITING: 0
SORE THROAT: 0
NECK STIFFNESS: 0
JOINT SWELLING: 0
DIARRHEA: 0
MUSCULOSKELETAL NEGATIVE: 1
NECK PAIN: 0
MYALGIAS: 0
SHORTNESS OF BREATH: 0
ARTHRALGIAS: 0
CARDIOVASCULAR NEGATIVE: 1
WEAKNESS: 0
NAUSEA: 0
EYES NEGATIVE: 1
LIGHT-HEADEDNESS: 0
RESPIRATORY NEGATIVE: 1
ALLERGIC/IMMUNOLOGIC NEGATIVE: 1
BACK PAIN: 0
PALPITATIONS: 0
RHINORRHEA: 0
HEADACHES: 0
COUGH: 0
FEVER: 0
DIZZINESS: 0

## 2022-12-15 NOTE — PROGRESS NOTES
Chief Complaint:    Chief Complaint   Patient presents with     Urgent Care     Otalgia     Left ear, did one course of amoxicillin and didn't do anything (this is similar sx when had sinus last yr)     ASSESSMENT    Vital signs reviewed by Liang Montgomery PA-C  /81 (BP Location: Left arm, Patient Position: Sitting, Cuff Size: Adult Large)   Pulse 107   Temp 97.6  F (36.4  C) (Tympanic)   Wt 87.8 kg (193 lb 9.6 oz)   SpO2 98%   BMI 32.22 kg/m       1. Acute suppurative otitis media of left ear without spontaneous rupture of tympanic membrane, recurrence not specified         PLAN    Patient given 1 G Rocephin IM inn clinic.  Rx for Prednisone and Omnicef sent in.  Fluids, vaporizer, acetaminophen, and or ibuprofen for pain.  Follow up with ENT as scheduled.    Worrisome symptoms discussed with instructions to go to the ED.  Patient verbalized understanding and agreed with this plan.     LABS:    No results found for any visits on 12/15/22.      Respiratory History  occasional episodes of bronchitis    Current Meds    Current Outpatient Medications:      cefdinir (OMNICEF) 300 MG capsule, Take 1 capsule (300 mg) by mouth 2 times daily for 14 days, Disp: 28 capsule, Rfl: 0     hydrOXYzine (ATARAX) 25 MG tablet, Take 25 mg by mouth, Disp: , Rfl:      ofloxacin (OCUFLOX) 0.3 % ophthalmic solution, 5 drops into Left EAR two times daily for 7 days, Disp: 10 mL, Rfl: 1     phentermine (ADIPEX-P) 37.5 MG tablet, TAKE ONE TABLET DAILY IN THE MORNING, Disp: , Rfl:      predniSONE (DELTASONE) 20 MG tablet, Take 2 tablets (40 mg) by mouth daily for 5 days, Disp: 10 tablet, Rfl: 0     sertraline (ZOLOFT) 100 MG tablet, Take 100 mg by mouth, Disp: , Rfl:     Current Facility-Administered Medications:      cefTRIAXone (ROCEPHIN) injection 1 g, 1 g, Intramuscular, Once, Liang Montgomery PA-C    Problem history  Patient Active Problem List   Diagnosis     Knee pain     Edema     Bilateral knee pain        Allergies  Allergies   Allergen Reactions     Codeine Nausea and Vomiting     Latex      Vicodin [Hydrocodone-Acetaminophen]          SUBJECTIVE    HPI:Arabella Friend is an 32 year old female who presents  for possible ear infection. Symptoms include ear pain on left and drainage on left. Onset 3 weeks, unchanged since that time.  Patient has a Hx of problems with this ear.  She just finished a round of Augmentin with little improvement.  Ear history: frequent episodes of otitis.    Patient is eating and drinking well.  No fever, diarrhea or vomiting.  No cough, or wheezing.    ROS:    Review of Systems   Constitutional: Negative for chills and fever.   HENT: Positive for ear discharge and ear pain. Negative for congestion, rhinorrhea and sore throat.    Eyes: Negative.    Respiratory: Negative.  Negative for cough and shortness of breath.    Cardiovascular: Negative.  Negative for chest pain and palpitations.   Gastrointestinal: Negative for diarrhea, nausea and vomiting.   Endocrine: Negative.    Genitourinary: Negative.    Musculoskeletal: Negative.  Negative for arthralgias, back pain, joint swelling, myalgias, neck pain and neck stiffness.   Skin: Negative.  Negative for rash and wound.   Allergic/Immunologic: Negative.  Negative for immunocompromised state.   Neurological: Negative for dizziness, weakness, light-headedness and headaches.        Family History   History reviewed. No pertinent family history.     Social History  Social History     Socioeconomic History     Marital status: Single     Spouse name: Not on file     Number of children: Not on file     Years of education: Not on file     Highest education level: Not on file   Occupational History     Not on file   Tobacco Use     Smoking status: Never     Smokeless tobacco: Never   Substance and Sexual Activity     Alcohol use: Yes     Drug use: Never     Sexual activity: Not on file   Other Topics Concern     Not on file   Social History  Narrative     Not on file     Social Determinants of Health     Financial Resource Strain: Not on file   Food Insecurity: Not on file   Transportation Needs: Not on file   Physical Activity: Not on file   Stress: Not on file   Social Connections: Not on file   Intimate Partner Violence: Not on file   Housing Stability: Not on file        OBJECTIVE     Physical Exam:     Vital signs reviewed by Liang Montgomery PA-C  /81 (BP Location: Left arm, Patient Position: Sitting, Cuff Size: Adult Large)   Pulse 107   Temp 97.6  F (36.4  C) (Tympanic)   Wt 87.8 kg (193 lb 9.6 oz)   SpO2 98%   BMI 32.22 kg/m       Physical Exam:    Physical Exam  Vitals and nursing note reviewed.   Constitutional:       General: She is not in acute distress.     Appearance: She is well-developed. She is not ill-appearing, toxic-appearing or diaphoretic.   HENT:      Head: Normocephalic and atraumatic.      Right Ear: Hearing, tympanic membrane, ear canal and external ear normal. Tympanic membrane is not perforated, erythematous, retracted or bulging.      Left Ear: Hearing, ear canal and external ear normal. Drainage present. No laceration or swelling. A PE tube is present. Tympanic membrane is erythematous. Tympanic membrane is not perforated, retracted or bulging.      Nose: Congestion present. No mucosal edema or rhinorrhea.      Right Sinus: No maxillary sinus tenderness or frontal sinus tenderness.      Left Sinus: No maxillary sinus tenderness or frontal sinus tenderness.      Mouth/Throat:      Pharynx: Posterior oropharyngeal erythema present. No pharyngeal swelling, oropharyngeal exudate or uvula swelling.      Tonsils: No tonsillar exudate or tonsillar abscesses. 0 on the right. 0 on the left.   Eyes:      General:         Right eye: No discharge.         Left eye: No discharge.      Pupils: Pupils are equal, round, and reactive to light.   Cardiovascular:      Rate and Rhythm: Normal rate and regular rhythm.      Heart  sounds: Normal heart sounds. No murmur heard.    No friction rub. No gallop.   Pulmonary:      Effort: Pulmonary effort is normal. No respiratory distress.      Breath sounds: Normal breath sounds. No decreased breath sounds, wheezing, rhonchi or rales.   Chest:      Chest wall: No tenderness.   Abdominal:      General: Bowel sounds are normal. There is no distension.      Palpations: Abdomen is soft. There is no mass.      Tenderness: There is no abdominal tenderness. There is no guarding.   Musculoskeletal:      Cervical back: Normal range of motion and neck supple.   Lymphadenopathy:      Head:      Right side of head: No submental, submandibular, tonsillar, preauricular or posterior auricular adenopathy.      Left side of head: No submental, submandibular, tonsillar, preauricular or posterior auricular adenopathy.      Cervical: No cervical adenopathy.      Right cervical: No superficial or posterior cervical adenopathy.     Left cervical: No superficial or posterior cervical adenopathy.   Skin:     General: Skin is warm and dry.      Findings: No rash.   Neurological:      Mental Status: She is alert and oriented to person, place, and time.      Cranial Nerves: No cranial nerve deficit.      Deep Tendon Reflexes: Reflexes are normal and symmetric.   Psychiatric:         Behavior: Behavior normal. Behavior is cooperative.         Thought Content: Thought content normal.         Judgment: Judgment normal.            Liang Montgomery PA-C  12/15/2022, 3:48 PM

## 2022-12-15 NOTE — NURSING NOTE
Clinic Administered Medication Documentation      Injectable Medication Documentation    Patient was given Ceftriaxone Sodium (Rocephin). Prior to medication administration, verified patients identity using patient s name and date of birth. Please see MAR and medication order for additional information. Patient instructed to pt states had shot before without reaction, do not want to wait for the 15 minutes..      Was entire vial of medication used? Yes  Vial/Syringe: Single dose vial  Expiration Date:  08/2024  Was this medication supplied by the patient? No     Isaias Barclay [facility]

## 2023-01-04 ENCOUNTER — OFFICE VISIT (OUTPATIENT)
Dept: OTOLARYNGOLOGY | Facility: OTHER | Age: 33
End: 2023-01-04
Payer: COMMERCIAL

## 2023-01-04 VITALS
DIASTOLIC BLOOD PRESSURE: 82 MMHG | WEIGHT: 196 LBS | SYSTOLIC BLOOD PRESSURE: 122 MMHG | TEMPERATURE: 97.2 F | BODY MASS INDEX: 32.65 KG/M2 | HEIGHT: 65 IN

## 2023-01-04 DIAGNOSIS — H66.92 ACUTE OTITIS MEDIA, LEFT: Primary | ICD-10-CM

## 2023-01-04 PROCEDURE — 99213 OFFICE O/P EST LOW 20 MIN: CPT | Performed by: OTOLARYNGOLOGY

## 2023-01-04 RX ORDER — OFLOXACIN 3 MG/ML
5 SOLUTION AURICULAR (OTIC) DAILY
Qty: 5 ML | Refills: 0 | Status: SHIPPED | OUTPATIENT
Start: 2023-01-04 | End: 2023-01-18

## 2023-01-04 ASSESSMENT — PAIN SCALES - GENERAL: PAINLEVEL: SEVERE PAIN (6)

## 2023-01-04 NOTE — PROGRESS NOTES
History of Present Illness - Arabella Friend is a 32 year old female presenting in clinic today for a recheck on Patient presents with:  Follow Up    Showed prior history of of left temporal mastoiditis had tube placed over a year ago.  She was hospitalized at that time.  She has had several recurrence of her otitis on that side with a tube in with intermittent drainage.  Had another recurrence few weeks ago was seen in urgent care given IV Rocephin and placed on oral cefdinir.  She was also told to use hydrogen peroxide since the tube appeared to be plugged.  That was burning a lot and she stopped a couple of days ago.  Had some postauricular tenderness and had purulent drainage.  Now feels much much better.  Does not feel her hearing has changed.        BP Readings from Last 1 Encounters:   01/04/23 122/82       BP noted to be well controlled today in office.     Arabella IS NOT a smoker/uses chewing tobacco.     Past Medical History - No past medical history on file.    Current Medications -   Current Outpatient Medications:      hydrOXYzine (ATARAX) 25 MG tablet, Take 25 mg by mouth, Disp: , Rfl:      ofloxacin (OCUFLOX) 0.3 % ophthalmic solution, 5 drops into Left EAR two times daily for 7 days, Disp: 10 mL, Rfl: 1     phentermine (ADIPEX-P) 37.5 MG tablet, TAKE ONE TABLET DAILY IN THE MORNING, Disp: , Rfl:      sertraline (ZOLOFT) 100 MG tablet, Take 100 mg by mouth, Disp: , Rfl:     Allergies -   Allergies   Allergen Reactions     Codeine Nausea and Vomiting     Latex      Vicodin [Hydrocodone-Acetaminophen]        Social History -   Social History     Socioeconomic History     Marital status: Single   Tobacco Use     Smoking status: Never     Smokeless tobacco: Never   Substance and Sexual Activity     Alcohol use: Yes     Drug use: Never       Family History - No family history on file.    Review of Systems - As per HPI and PMHx, otherwise review of system review of the head and neck negative. Otherwise 10+  "review of system is negative    Physical Exam  /82   Temp 97.2  F (36.2  C) (Temporal)   Ht 1.651 m (5' 5\")   Wt 88.9 kg (196 lb)   BMI 32.62 kg/m    BMI: Body mass index is 32.62 kg/m .    General - The patient is well nourished and well developed, and appears to have good nutritional status.  Alert and oriented to person and place, answers questions and cooperates with examination appropriately.    SKIN - No suspicious lesions or rashes.  Respiration - No respiratory distress.  Head and Face - Normocephalic and atraumatic, with no gross asymmetry noted of the contour of the facial features.  The facial nerve is intact, with strong symmetric movements.    Voice and Breathing - The patient was breathing comfortably without the use of accessory muscles. The patients voice was clear and strong, and had appropriate pitch and quality.    Ears - Bilateral pinna and EACs with normal appearing overlying skin.  Right tympanic membrane intact with good mobility on pneumatic otoscopy . Bony landmarks of the ossicular chain are normal. The tympanic membrane is normal in appearance. No retraction, perforation, or masses.  No fluid or purulence was seen in the external canal or the middle ear.   On the left side we do see Dura-Vent tube still in good position appears to little bit of crusting over the dissection and is open now.  Tympanic membrane overall clear.  Middle ear space mucosa appears to be clear as I can see through the tube and dry.  Eyes - Extraocular movements intact.  Sclera were not icteric or injected, conjunctiva were pink and moist.          Neck - Normal midline excursion of the laryngotracheal complex during swallowing.  Full range of motion on passive movement.  Palpation of the occipital, submental, submandibular, internal jugular chain, and supraclavicular nodes did not demonstrate any abnormal lymph nodes or masses.  The carotid pulse was palpable bilaterally.  Palpation of the thyroid was soft " and smooth, with no nodules or goiter appreciated.  The trachea was mobile and midline.  No postauricular tenderness noted over the mastoid on the left.    Neuro - Nonfocal neuro exam is normal, CN 2 through 12 intact, normal gait and muscle tone.      Performed in clinic today:  No procedures preformed in clinic today      A/P - Arabella Friend is a 32 year old female Patient presents with:  Follow Up    Patient with a history of acute otomastoiditis on the left and several episodes of otitis media.  She is scheduled to see Dr. Torito León one of my colleagues who does a lot of otologic work in [place].  In the meantime we will place her on Floxin drops for 2 more weeks for the left ear to make sure there is no interim infection.    Arabella should follow up with Dr. León as scheduled.            Willy Jones MD

## 2023-01-04 NOTE — PROGRESS NOTES
"History of Present Illness - Arabella Friend is a 32 year old female presenting in clinic today for a recheck on No chief complaint on file.    ***    Present Symptoms include: {ENT ASSOCIATED SX:895025} and they are   {ENT SEVERITY STANDIN} .  Arabella denies {ENT ASSOCIATED SX:089163}.      There is no height or weight on file to calculate BMI.    {Weight Management Plan -- Delete if patient has a normal BMI:193169}    BP Readings from Last 1 Encounters:   12/15/22 138/81       {htnspecialty:217598}    Arabella {IS:274771} a smoker/uses chewing tobacco.  Arabella {QUITTIN::\"is not ready to quit\"}      Past Medical History - No past medical history on file.    Current Medications -   Current Outpatient Medications:      hydrOXYzine (ATARAX) 25 MG tablet, Take 25 mg by mouth, Disp: , Rfl:      ofloxacin (OCUFLOX) 0.3 % ophthalmic solution, 5 drops into Left EAR two times daily for 7 days, Disp: 10 mL, Rfl: 1     phentermine (ADIPEX-P) 37.5 MG tablet, TAKE ONE TABLET DAILY IN THE MORNING, Disp: , Rfl:      sertraline (ZOLOFT) 100 MG tablet, Take 100 mg by mouth, Disp: , Rfl:     Allergies -   Allergies   Allergen Reactions     Codeine Nausea and Vomiting     Latex      Vicodin [Hydrocodone-Acetaminophen]        Social History -   Social History     Socioeconomic History     Marital status: Single   Tobacco Use     Smoking status: Never     Smokeless tobacco: Never   Substance and Sexual Activity     Alcohol use: Yes     Drug use: Never       Family History - No family history on file.    Review of Systems - As per HPI and PMHx, otherwise review of system review of the head and neck negative. Otherwise 10+ review of system is negative    Physical Exam  There were no vitals taken for this visit.  BMI: There is no height or weight on file to calculate BMI.    General - The patient is well nourished and well developed, and appears to have good nutritional status.  Alert and oriented to person and place, answers " questions and cooperates with examination appropriately.    SKIN - No suspicious lesions or rashes.  Respiration - No respiratory distress.  Head and Face - Normocephalic and atraumatic, with no gross asymmetry noted of the contour of the facial features.  The facial nerve is intact, with strong symmetric movements.    Voice and Breathing - The patient was breathing comfortably without the use of accessory muscles. The patients voice was clear and strong, and had appropriate pitch and quality.    Ears - Bilateral pinna and EACs with normal appearing overlying skin. Tympanic membrane intact with good mobility on pneumatic otoscopy bilaterally. Bony landmarks of the ossicular chain are normal. The tympanic membranes are normal in appearance. No retraction, perforation, or masses.  No fluid or purulence was seen in the external canal or the middle ear.     Eyes - Extraocular movements intact.  Sclera were not icteric or injected, conjunctiva were pink and moist.    Mouth - Examination of the oral cavity showed pink, healthy oral mucosa. No lesions or ulcerations noted.  The tongue was mobile and midline, and the dentition were in good condition.      Throat - The walls of the oropharynx were smooth, pink, moist, symmetric, and had no lesions or ulcerations.  The tonsillar pillars and soft palate were symmetric. Tonsils are {ENT TONSILS:667160}. The uvula was midline on elevation.    Neck - Normal midline excursion of the laryngotracheal complex during swallowing.  Full range of motion on passive movement.  Palpation of the occipital, submental, submandibular, internal jugular chain, and supraclavicular nodes did not demonstrate any abnormal lymph nodes or masses.  The carotid pulse was palpable bilaterally.  Palpation of the thyroid was soft and smooth, with no nodules or goiter appreciated.  The trachea was mobile and midline.    Nose - External contour is symmetric, no gross deflection or scars.  Nasal mucosa is pink  "and moist with no abnormal mucus.  The septum was midline and non-obstructive, turbinates of normal size and position.  No polyps, masses, or purulence noted on examination.    Neuro - Nonfocal neuro exam is normal, CN 2 through 12 intact, normal gait and muscle tone.      Performed in clinic today:  {Preformedtoday1:524936}      A/P - Arabella Friend is a 32 year old female No chief complaint on file.    ***    [facility] should follow up {FOLLOW UP:865798}.      At [facility] next appointment they {WILL:506360::\"will\"} need a hearing test.      Willy Jones MD        "

## 2023-01-04 NOTE — Clinical Note
"    2023         RE: Arabella Friend  [address] [facility] [address]        Dear Colleague,    Thank you for referring your patient, Arabella Friend, to the [facility]. Please see a copy of my visit note below.    History of Present Illness - Arabella Friend is a 32 year old female presenting in clinic today for a recheck on No chief complaint on file.    ***    Present Symptoms include: {ENT ASSOCIATED SX:325199} and they are   {ENT SEVERITY STANDIN} .  Arabella denies {ENT ASSOCIATED SX:974946}.      There is no height or weight on file to calculate BMI.    {Weight Management Plan -- Delete if patient has a normal BMI:976955}    BP Readings from Last 1 Encounters:   12/15/22 138/81       {htnspecialty:781293}    Arabella {IS:472065} a smoker/uses chewing tobacco.  Arabella {QUITTIN::\"is not ready to quit\"}      Past Medical History - No past medical history on file.    Current Medications -   Current Outpatient Medications:      hydrOXYzine (ATARAX) 25 MG tablet, Take 25 mg by mouth, Disp: , Rfl:      ofloxacin (OCUFLOX) 0.3 % ophthalmic solution, 5 drops into Left EAR two times daily for 7 days, Disp: 10 mL, Rfl: 1     phentermine (ADIPEX-P) 37.5 MG tablet, TAKE ONE TABLET DAILY IN THE MORNING, Disp: , Rfl:      sertraline (ZOLOFT) 100 MG tablet, Take 100 mg by mouth, Disp: , Rfl:     Allergies -   Allergies   Allergen Reactions     Codeine Nausea and Vomiting     Latex      Vicodin [Hydrocodone-Acetaminophen]        Social History -   Social History     Socioeconomic History     Marital status: Single   Tobacco Use     Smoking status: Never     Smokeless tobacco: Never   Substance and Sexual Activity     Alcohol use: Yes     Drug use: Never       Family History - No family history on file.    Review of Systems - As per HPI and PMHx, otherwise review of system review of the head and neck negative. Otherwise 10+ review of system is negative    Physical Exam  There were " Referral to Pain management by Dr Che    Recommend [facility]. Please get referral from PCP if needed.    .You may receive a survey by email or text about your experience with us. We greatly appreciate you completing the survey. We use your valuable feedback to improve the care that we deliver and your responses are very important to us. Thank you for choosing [facility]     no vitals taken for this visit.  BMI: There is no height or weight on file to calculate BMI.    General - The patient is well nourished and well developed, and appears to have good nutritional status.  Alert and oriented to person and place, answers questions and cooperates with examination appropriately.    SKIN - No suspicious lesions or rashes.  Respiration - No respiratory distress.  Head and Face - Normocephalic and atraumatic, with no gross asymmetry noted of the contour of the facial features.  The facial nerve is intact, with strong symmetric movements.    Voice and Breathing - The patient was breathing comfortably without the use of accessory muscles. The patients voice was clear and strong, and had appropriate pitch and quality.    Ears - Bilateral pinna and EACs with normal appearing overlying skin. Tympanic membrane intact with good mobility on pneumatic otoscopy bilaterally. Bony landmarks of the ossicular chain are normal. The tympanic membranes are normal in appearance. No retraction, perforation, or masses.  No fluid or purulence was seen in the external canal or the middle ear.     Eyes - Extraocular movements intact.  Sclera were not icteric or injected, conjunctiva were pink and moist.    Mouth - Examination of the oral cavity showed pink, healthy oral mucosa. No lesions or ulcerations noted.  The tongue was mobile and midline, and the dentition were in good condition.      Throat - The walls of the oropharynx were smooth, pink, moist, symmetric, and had no lesions or ulcerations.  The tonsillar pillars and soft palate were symmetric. Tonsils are {ENT TONSILS:859861}. The uvula was midline on elevation.    Neck - Normal midline excursion of the laryngotracheal complex during swallowing.  Full range of motion on passive movement.  Palpation of the occipital, submental, submandibular, internal jugular chain, and supraclavicular nodes did not demonstrate any abnormal lymph nodes or masses.  The  "carotid pulse was palpable bilaterally.  Palpation of the thyroid was soft and smooth, with no nodules or goiter appreciated.  The trachea was mobile and midline.    Nose - External contour is symmetric, no gross deflection or scars.  Nasal mucosa is pink and moist with no abnormal mucus.  The septum was midline and non-obstructive, turbinates of normal size and position.  No polyps, masses, or purulence noted on examination.    Neuro - Nonfocal neuro exam is normal, CN 2 through 12 intact, normal gait and muscle tone.      Performed in clinic today:  {Preformedtoday1:198358}      A/P - Arabella Friend is a 32 year old female No chief complaint on file.    ***    Arabella should follow up {FOLLOW UP:422701}.      At Arabella next appointment they {WILL:158054::\"will\"} need a hearing test.      Willy Jones MD          History of Present Illness - Arabella Friend is a 32 year old female presenting in clinic today for a recheck on Patient presents with:  Follow Up    Showed prior history of of left temporal mastoiditis had tube placed over a year ago.  She was hospitalized at that time.  She has had several recurrence of her otitis on that side with a tube in with intermittent drainage.  Had another recurrence few weeks ago was seen in urgent care given IV Rocephin and placed on oral cefdinir.  She was also told to use hydrogen peroxide since the tube appeared to be plugged.  That was burning a lot and she stopped a couple of days ago.  Had some postauricular tenderness and had purulent drainage.  Now feels much much better.  Does not feel her hearing has changed.        BP Readings from Last 1 Encounters:   01/04/23 122/82       BP noted to be well controlled today in office.     Arabella IS NOT a smoker/uses chewing tobacco.     Past Medical History - No past medical history on file.    Current Medications -   Current Outpatient Medications:      hydrOXYzine (ATARAX) 25 MG tablet, Take 25 mg by mouth, Disp: , Rfl:      " "ofloxacin (OCUFLOX) 0.3 % ophthalmic solution, 5 drops into Left EAR two times daily for 7 days, Disp: 10 mL, Rfl: 1     phentermine (ADIPEX-P) 37.5 MG tablet, TAKE ONE TABLET DAILY IN THE MORNING, Disp: , Rfl:      sertraline (ZOLOFT) 100 MG tablet, Take 100 mg by mouth, Disp: , Rfl:     Allergies -   Allergies   Allergen Reactions     Codeine Nausea and Vomiting     Latex      Vicodin [Hydrocodone-Acetaminophen]        Social History -   Social History     Socioeconomic History     Marital status: Single   Tobacco Use     Smoking status: Never     Smokeless tobacco: Never   Substance and Sexual Activity     Alcohol use: Yes     Drug use: Never       Family History - No family history on file.    Review of Systems - As per HPI and PMHx, otherwise review of system review of the head and neck negative. Otherwise 10+ review of system is negative    Physical Exam  /82   Temp 97.2  F (36.2  C) (Temporal)   Ht 1.651 m (5' 5\")   Wt 88.9 kg (196 lb)   BMI 32.62 kg/m    BMI: Body mass index is 32.62 kg/m .    General - The patient is well nourished and well developed, and appears to have good nutritional status.  Alert and oriented to person and place, answers questions and cooperates with examination appropriately.    SKIN - No suspicious lesions or rashes.  Respiration - No respiratory distress.  Head and Face - Normocephalic and atraumatic, with no gross asymmetry noted of the contour of the facial features.  The facial nerve is intact, with strong symmetric movements.    Voice and Breathing - The patient was breathing comfortably without the use of accessory muscles. The patients voice was clear and strong, and had appropriate pitch and quality.    Ears - Bilateral pinna and EACs with normal appearing overlying skin.  Right tympanic membrane intact with good mobility on pneumatic otoscopy . Bony landmarks of the ossicular chain are normal. The tympanic membrane is normal in appearance. No retraction, " perforation, or masses.  No fluid or purulence was seen in the external canal or the middle ear.   On the left side we do see Dura-Vent tube still in good position appears to little bit of crusting over the dissection and is open now.  Tympanic membrane overall clear.  Middle ear space mucosa appears to be clear as I can see through the tube and dry.  Eyes - Extraocular movements intact.  Sclera were not icteric or injected, conjunctiva were pink and moist.          Neck - Normal midline excursion of the laryngotracheal complex during swallowing.  Full range of motion on passive movement.  Palpation of the occipital, submental, submandibular, internal jugular chain, and supraclavicular nodes did not demonstrate any abnormal lymph nodes or masses.  The carotid pulse was palpable bilaterally.  Palpation of the thyroid was soft and smooth, with no nodules or goiter appreciated.  The trachea was mobile and midline.  No postauricular tenderness noted over the mastoid on the left.    Neuro - Nonfocal neuro exam is normal, CN 2 through 12 intact, normal gait and muscle tone.      Performed in clinic today:  No procedures preformed in clinic today      A/P - Arabella Friend is a 32 year old female Patient presents with:  Follow Up    Patient with a history of acute otomastoiditis on the left and several episodes of otitis media.  She is scheduled to see Dr. Torito León one of my colleagues who does a lot of otologic work in [place].  In the meantime we will place her on Floxin drops for 2 more weeks for the left ear to make sure there is no interim infection.    Arabella should follow up with Dr. León as scheduled.            Willy Jones MD            Again, thank you for allowing me to participate in the care of your patient.        Sincerely,        Willy Jones MD, MD

## 2023-01-21 ENCOUNTER — HEALTH MAINTENANCE LETTER (OUTPATIENT)
Age: 33
End: 2023-01-21

## 2023-02-28 NOTE — PROGRESS NOTES
CHIEF COMPLAINT: Patient presents with:  Ear Problem: Left ear problems for a year last January was in the hospital for mastoiditis and was sepsis and got a tube placed this helped temporality and has still gotten infections and been in the ER symptoms now is muffled/ vibration/ edwin pitched ringing/ draining and the pain is spreading down into her neck          HISTORY OF PRESENT ILLNESS    Arabella was seen at the behest of (Generic Provider) for left ear pain and drainage.  She was seen by Dr. Jones 1 year ago for acute mastoiditis nad had an ear tube placed.  She complains of ear pain that radiates down her right neck.   No other ENT related concerns.     Referral note:    Injectable Medication Documentation     Patient was given Ceftriaxone Sodium (Rocephin). Prior to medication administration, verified patients identity using patient s name and date of birth. Please see MAR and medication order for additional information. Patient instructed to pt states had shot before without reaction, do not want to wait for the 15 minutes..        Was entire vial of medication used? Yes  Vial/Syringe: Single dose vial  Expiration Date:  08/2024  Was this medication supplied by the patient? No      Isaias Barclay CMA       REVIEW OF SYSTEMS    Review of Systems as per HPI and PMHx, otherwise 10 system review system are negative.       ALLERGIES    Codeine, Latex, Vicodin [hydrocodone-acetaminophen], and Rash away    CURRENT MEDICATIONS      Current Outpatient Medications:      ciprofloxacin-dexamethasone (CIPRODEX) 0.3-0.1 % otic suspension, Place 4 drops Into the left ear 2 times daily for 7 days, Disp: 2.8 mL, Rfl: 0     hydrOXYzine (ATARAX) 25 MG tablet, Take 25 mg by mouth, Disp: , Rfl:      phentermine (ADIPEX-P) 37.5 MG tablet, TAKE ONE TABLET DAILY IN THE MORNING, Disp: , Rfl:      sertraline (ZOLOFT) 100 MG tablet, Take 100 mg by mouth, Disp: , Rfl:      ofloxacin (OCUFLOX) 0.3 % ophthalmic solution, 5 drops into  Left EAR two times daily for 7 days (Patient not taking: Reported on 3/1/2023), Disp: 10 mL, Rfl: 1     PAST MEDICAL HISTORY    PAST MEDICAL HISTORY: No past medical history on file.    PAST SURGICAL HISTORY    PAST SURGICAL HISTORY:   Past Surgical History:   Procedure Laterality Date     ORTHOPEDIC SURGERY         FAMILY  HISTORY    FAMILY HISTORY: No family history on file.    SOCIAL HISTORY    SOCIAL HISTORY:   Social History     Tobacco Use     Smoking status: Never     Smokeless tobacco: Never   Substance Use Topics     Alcohol use: Yes        PHYSICAL EXAM    HEAD: Normal appearance and symmetry:  No cutaneous lesions.      NECK:  supple     EARS:    Right:   External ears normal. Canals clear. TM's normal.   LEFT:  Extruded tube with desquamated debris in EAC (removed with     Alligator forceps:  Perforation in left TM (C/D/)    EYES:  EOMI    CN VII/XII:  intact     NOSE:     Dorsum:   straight  Septum:  midline  Mucosa:  moist        ORAL CAVITY/OROPHARYNX:     Lips:  Normal.  Tongue: normal, midline  Mucosa:   no lesions     NECK:  Trachea:  midline.              Thyroid:  normal              Adenopathy:  none        NEURO:   Alert and Oriented     GAIT AND STATION:  normal     RESPIRATORY:   Symmetry and Respiratory effort     PSYCH:  Normal mood and affect     SKIN:   warm and dry         IMPRESSION:    Encounter Diagnoses   Name Primary?     Keratin debris of ear canal, left Yes     Perforation of tympanic membrane, left           RECOMMENDATIONS:      Orders Placed This Encounter   Procedures     MT EAR DEBRIDEMENT      Return visit for epidisc placement LEFT ear.   Orders Placed This Encounter   Medications     ciprofloxacin-dexamethasone (CIPRODEX) 0.3-0.1 % otic suspension     Sig: Place 4 drops Into the left ear 2 times daily for 7 days     Dispense:  2.8 mL     Refill:  0

## 2023-03-01 ENCOUNTER — OFFICE VISIT (OUTPATIENT)
Dept: OTOLARYNGOLOGY | Facility: CLINIC | Age: 33
End: 2023-03-01
Payer: COMMERCIAL

## 2023-03-01 DIAGNOSIS — H72.92 PERFORATION OF TYMPANIC MEMBRANE, LEFT: ICD-10-CM

## 2023-03-01 DIAGNOSIS — H61.22 KERATIN DEBRIS OF EAR CANAL, LEFT: Primary | ICD-10-CM

## 2023-03-01 PROCEDURE — 92504 EAR MICROSCOPY EXAMINATION: CPT | Performed by: OTOLARYNGOLOGY

## 2023-03-01 PROCEDURE — 99214 OFFICE O/P EST MOD 30 MIN: CPT | Mod: 25 | Performed by: OTOLARYNGOLOGY

## 2023-03-01 RX ORDER — CIPROFLOXACIN AND DEXAMETHASONE 3; 1 MG/ML; MG/ML
4 SUSPENSION/ DROPS AURICULAR (OTIC) 2 TIMES DAILY
Qty: 2.8 ML | Refills: 0 | Status: SHIPPED | OUTPATIENT
Start: 2023-03-01 | End: 2023-03-08

## 2023-03-01 NOTE — Clinical Note
3/1/2023         RE: Arabella Friend  [address] [facility] [address]        Dear Colleague,    Thank you for referring your patient, Arabella Friend, to the [facility]. Please see a copy of my visit note below.    CHIEF COMPLAINT: Patient presents with:  Ear Problem: Left ear problems for a year last January was in the hospital for mastoiditis and was sepsis and got a tube placed this helped temporality and has still gotten infections and been in the ER symptoms now is muffled/ vibration/ edwin pitched ringing/ draining and the pain is spreading down into her neck          HISTORY OF PRESENT ILLNESS    Arabella was seen at the behest of (Generic Provider) for left ear pain and drainage.  She was seen by Dr. Jones 1 year ago for acute mastoiditis nad had an ear tube placed.  She complains of ear pain that radiates down her right neck.   No other ENT related concerns.     Referral note:    Injectable Medication Documentation     Patient was given Ceftriaxone Sodium (Rocephin). Prior to medication administration, verified patients identity using patient s name and date of birth. Please see MAR and medication order for additional information. Patient instructed to pt states had shot before without reaction, do not want to wait for the 15 minutes..        Was entire vial of medication used? Yes  Vial/Syringe: Single dose vial  Expiration Date:  08/2024  Was this medication supplied by the patient? No      Isaias Barclay CMA       REVIEW OF SYSTEMS    Review of Systems as per HPI and PMHx, otherwise 10 system review system are negative.       ALLERGIES    Codeine, Latex, Vicodin [hydrocodone-acetaminophen], and Rash away    CURRENT MEDICATIONS      Current Outpatient Medications:      ciprofloxacin-dexamethasone (CIPRODEX) 0.3-0.1 % otic suspension, Place 4 drops Into the left ear 2 times daily for 7 days, Disp: 2.8 mL, Rfl: 0     hydrOXYzine (ATARAX) 25 MG tablet, Take 25 mg  by mouth, Disp: , Rfl:      phentermine (ADIPEX-P) 37.5 MG tablet, TAKE ONE TABLET DAILY IN THE MORNING, Disp: , Rfl:      sertraline (ZOLOFT) 100 MG tablet, Take 100 mg by mouth, Disp: , Rfl:      ofloxacin (OCUFLOX) 0.3 % ophthalmic solution, 5 drops into Left EAR two times daily for 7 days (Patient not taking: Reported on 3/1/2023), Disp: 10 mL, Rfl: 1     PAST MEDICAL HISTORY    PAST MEDICAL HISTORY: No past medical history on file.    PAST SURGICAL HISTORY    PAST SURGICAL HISTORY:   Past Surgical History:   Procedure Laterality Date     ORTHOPEDIC SURGERY         FAMILY  HISTORY    FAMILY HISTORY: No family history on file.    SOCIAL HISTORY    SOCIAL HISTORY:   Social History     Tobacco Use     Smoking status: Never     Smokeless tobacco: Never   Substance Use Topics     Alcohol use: Yes        PHYSICAL EXAM    HEAD: Normal appearance and symmetry:  No cutaneous lesions.      NECK:  supple     EARS:    Right:   External ears normal. Canals clear. TM's normal.   LEFT:  Extruded tube with desquamated debris in EAC (removed with     Alligator forceps:  Perforation in left TM (C/D/)    EYES:  EOMI    CN VII/XII:  intact     NOSE:     Dorsum:   straight  Septum:  midline  Mucosa:  moist        ORAL CAVITY/OROPHARYNX:     Lips:  Normal.  Tongue: normal, midline  Mucosa:   no lesions     NECK:  Trachea:  midline.              Thyroid:  normal              Adenopathy:  none        NEURO:   Alert and Oriented     GAIT AND STATION:  normal     RESPIRATORY:   Symmetry and Respiratory effort     PSYCH:  Normal mood and affect     SKIN:   warm and dry         IMPRESSION:    Encounter Diagnoses   Name Primary?     Keratin debris of ear canal, left Yes     Perforation of tympanic membrane, left           RECOMMENDATIONS:      Orders Placed This Encounter   Procedures     WA EAR DEBRIDEMENT      Return visit for epidisc placement LEFT ear.   Orders Placed This Encounter   Medications     ciprofloxacin-dexamethasone  (CIPRODEX) 0.3-0.1 % otic suspension     Sig: Place 4 drops Into the left ear 2 times daily for 7 days     Dispense:  2.8 mL     Refill:  0           Again, thank you for allowing me to participate in the care of your patient.        Sincerely,        Torito León MD

## 2023-04-21 ENCOUNTER — OFFICE VISIT (OUTPATIENT)
Dept: OTOLARYNGOLOGY | Facility: CLINIC | Age: 33
End: 2023-04-21
Payer: COMMERCIAL

## 2023-04-21 DIAGNOSIS — H72.92 PERFORATION OF TYMPANIC MEMBRANE, LEFT: Primary | ICD-10-CM

## 2023-04-21 PROCEDURE — 99207 PR DROP WITH A PROCEDURE: CPT | Performed by: OTOLARYNGOLOGY

## 2023-04-21 PROCEDURE — 69610 TYMPANIC MEMBRANE REPAIR: CPT | Mod: LT | Performed by: OTOLARYNGOLOGY

## 2023-04-21 RX ORDER — SEMAGLUTIDE 1.7 MG/.75ML
INJECTION, SOLUTION SUBCUTANEOUS
COMMUNITY
Start: 2023-03-28

## 2023-04-21 NOTE — PROGRESS NOTES
CHIEF COMPLAINT:  Patient presents with:  Procedure: Epidisc placement, Left ear, pain, drainage          HISTORY OF PRESENT ILLNESS    Arabella was seen in follow up for Epidisc placement.      After obtaining written consent patient to my procedure room.  The left ear is injected with 3 cc 1% lidocaine with epinephrine 1000.  The edges of the perforation were then freshened with a pick.  An epi disc was trimmed to the appropriate position and placed directly over the perforation followed by bacitracin ointment.      IMPRESSION:   Encounter Diagnosis   Name Primary?     Perforation of tympanic membrane, left Yes            RECOMMENDATIONS:    Orders Placed This Encounter   Procedures     MYRINGOPLASTY      Patient tolerated procedure well without incident follow-up in 6 to 8 weeks.  No nose blowing for 2 weeks

## 2023-04-21 NOTE — LETTER
4/21/2023         RE: Arabella Friend  [address] [facility] [address]        Dear Colleague,    Thank you for referring your patient, Arabella Friend, to the [facility]. Please see a copy of my visit note below.    CHIEF COMPLAINT:  Patient presents with:  Procedure: Epidisc placement, Left ear, pain, drainage          HISTORY OF PRESENT ILLNESS    Arabella was seen in follow up for Epidisc placement.      After obtaining written consent patient to my procedure room.  The left ear is injected with 3 cc 1% lidocaine with epinephrine 1000.  The edges of the perforation were then freshened with a pick.  An epi disc was trimmed to the appropriate position and placed directly over the perforation followed by bacitracin ointment.      IMPRESSION:   Encounter Diagnosis   Name Primary?     Perforation of tympanic membrane, left Yes            RECOMMENDATIONS:    Orders Placed This Encounter   Procedures     MYRINGOPLASTY      Patient tolerated procedure well without incident follow-up in 6 to 8 weeks.  No nose blowing for 2 weeks      Again, thank you for allowing me to participate in the care of your patient.        Sincerely,        Torito León MD

## 2023-06-27 ENCOUNTER — TELEPHONE (OUTPATIENT)
Dept: OTOLARYNGOLOGY | Facility: CLINIC | Age: 33
End: 2023-06-27
Payer: COMMERCIAL

## 2023-06-27 NOTE — TELEPHONE ENCOUNTER
[facility]    Phone Message    May a detailed message be left on voicemail: yes     Reason for Call: Other: Patient is calling in to let you know that she was [facility] and they are putting her on cefcinir  300 MG 2xs daily for 10 days. The left ear was the one that looked the worse is the reason pt is letting you know. Please reach out toPt for a questions or concerns. Thank you      Action Taken: Message routed to:  [facility] Jluis    Travel Screening: Not Applicable

## 2023-06-28 NOTE — TELEPHONE ENCOUNTER
Called and left a VM to call back and to check in.  Left reminder of appointment date.  Let pt know she can call back if she would like.  Left clinic number.    [facility]      Katlyn Jamil RN  [facility]  ENT  [address] [place] [id number]  Office:[phone]  Employed by [employer]

## 2023-07-10 ENCOUNTER — OFFICE VISIT (OUTPATIENT)
Dept: OTOLARYNGOLOGY | Facility: CLINIC | Age: 33
End: 2023-07-10
Payer: COMMERCIAL

## 2023-07-10 DIAGNOSIS — H72.92 PERFORATION OF TYMPANIC MEMBRANE, LEFT: Primary | ICD-10-CM

## 2023-07-10 DIAGNOSIS — H61.22 KERATIN DEBRIS OF EAR CANAL, LEFT: ICD-10-CM

## 2023-07-10 DIAGNOSIS — J01.91 ACUTE RECURRENT SINUSITIS, UNSPECIFIED LOCATION: ICD-10-CM

## 2023-07-10 PROCEDURE — 99213 OFFICE O/P EST LOW 20 MIN: CPT | Mod: 25 | Performed by: OTOLARYNGOLOGY

## 2023-07-10 PROCEDURE — 92504 EAR MICROSCOPY EXAMINATION: CPT | Performed by: OTOLARYNGOLOGY

## 2023-07-10 RX ORDER — CEFDINIR 300 MG/1
300 CAPSULE ORAL 2 TIMES DAILY
COMMUNITY
Start: 2023-06-27

## 2023-07-10 RX ORDER — METRONIDAZOLE 7.5 MG/G
GEL VAGINAL
COMMUNITY
Start: 2023-06-20

## 2023-07-10 RX ORDER — FLUTICASONE PROPIONATE 50 MCG
SPRAY, SUSPENSION (ML) NASAL
COMMUNITY
Start: 2023-06-27

## 2023-07-10 RX ORDER — SEMAGLUTIDE 2.4 MG/.75ML
INJECTION, SOLUTION SUBCUTANEOUS
COMMUNITY
Start: 2023-03-28

## 2023-07-10 NOTE — LETTER
7/10/2023         RE: Arabella Friend  [address] [facility] [address]        Dear Colleague,    Thank you for referring your patient, Arabella Friend, to the [facility]. Please see a copy of my visit note below.    CHIEF COMPLAINT:  Patient presents with:  Follow Up: PT reports that hearing was doing well until she got another ear infection on 6/26 was placed on oral antibiotics. PT is having intermittent ear pain, muffled hearing, and ear drainage.          HISTORY OF PRESENT ILLNESS    Arabella was seen in follow up after previous 4/21/2023 visit for recheck after epidisc placement to LEFT ear on 4/21/2023.  She was doing well until she developed and sinus infection in the past few weeks resulted in a left otitis media                   REVIEW OF SYSTEMS    Review of Systems: a 10-system review is reviewed at this encounter.  See scanned document.       Allergies   Allergen Reactions     Codeine Nausea and Vomiting     Hydrocodone-Acetaminophen GI Disturbance and Nausea     Latex      Vicodin [Hydrocodone-Acetaminophen]      Petrolatum-Zinc Oxide Rash           PHYSICAL EXAM:        HEAD: Normal appearance and symmetry:  No cutaneous lesions.      EARS:      LEFT:    Yellow exudate on TM, which appears intact;      Crusted blood in EAC (removed); GF + Cipdrodex placed  NOSE:    Dorsum:   straight       ORAL CAVITY/OROPHARYNX:    Lips:  Normal.     NECK:  Trachea:  midline     NEURO:   Alert and Oriented    GAIT AND STATION:  normal     RESPIRATORY:   Symmetry and Respiratory effort    PSYCH:   normal mood and affect    SKIN:  warm and dry         IMPRESSION:   Encounter Diagnoses   Name Primary?     Perforation of tympanic membrane, left Yes     Acute recurrent sinusitis, unspecified location      Keratin debris of ear canal, left             RECOMMENDATIONS:    Orders Placed This Encounter   Procedures     [place] EAR DEBRIDEMENT     CT Sinus w/o Contrast      Orders Placed This  Encounter   Medications     cefdinir (OMNICEF) 300 MG capsule     Sig: Take 300 mg by mouth 2 times daily     cholecalciferol (VITAMIN D3) 10 mcg (400 units) TABS tablet     Sig: Take 10 mcg by mouth     fluticasone (FLONASE) 50 MCG/ACT nasal spray     Sig: spray 1 spray into each nostril twice a day     levonorgestrel (MIRENA) 52 MG (20 mcg/day) IUD     metroNIDAZOLE (METROGEL) 0.75 % vaginal gel     Sig: INSERT 1 APPLICATORFUL VAGINALLY EVERY DAY AT NIGHT     WEGOVY 2.4 MG/0.75ML pen     Sig: INJECT 0.75ML (2.4 MG) UNDER THE SKIN ONCE WEEKLY. DO NOT START BEFORE MAY 8           Again, thank you for allowing me to participate in the care of your patient.        Sincerely,        Torito León MD     Yes

## 2023-07-10 NOTE — PROGRESS NOTES
CHIEF COMPLAINT:  Patient presents with:  Follow Up: PT reports that hearing was doing well until she got another ear infection on 6/26 was placed on oral antibiotics. PT is having intermittent ear pain, muffled hearing, and ear drainage.          HISTORY OF PRESENT ILLNESS    Arabella was seen in follow up after previous 4/21/2023 visit for recheck after epidisc placement to LEFT ear on 4/21/2023.  She was doing well until she developed and sinus infection in the past few weeks resulted in a left otitis media                   REVIEW OF SYSTEMS    Review of Systems: a 10-system review is reviewed at this encounter.  See scanned document.       Allergies   Allergen Reactions     Codeine Nausea and Vomiting     Hydrocodone-Acetaminophen GI Disturbance and Nausea     Latex      Vicodin [Hydrocodone-Acetaminophen]      Petrolatum-Zinc Oxide Rash           PHYSICAL EXAM:        HEAD: Normal appearance and symmetry:  No cutaneous lesions.      EARS:      LEFT:    Yellow exudate on TM, which appears intact;      Crusted blood in EAC (removed); GF + Cipdrodex placed  NOSE:    Dorsum:   straight       ORAL CAVITY/OROPHARYNX:    Lips:  Normal.     NECK:  Trachea:  midline     NEURO:   Alert and Oriented    GAIT AND STATION:  normal     RESPIRATORY:   Symmetry and Respiratory effort    PSYCH:   normal mood and affect    SKIN:  warm and dry         IMPRESSION:   Encounter Diagnoses   Name Primary?     Perforation of tympanic membrane, left Yes     Acute recurrent sinusitis, unspecified location      Keratin debris of ear canal, left             RECOMMENDATIONS:    Orders Placed This Encounter   Procedures     CA EAR DEBRIDEMENT     CT Sinus w/o Contrast      Orders Placed This Encounter   Medications     cefdinir (OMNICEF) 300 MG capsule     Sig: Take 300 mg by mouth 2 times daily     cholecalciferol (VITAMIN D3) 10 mcg (400 units) TABS tablet     Sig: Take 10 mcg by mouth     fluticasone (FLONASE) 50 MCG/ACT nasal spray      Sig: spray 1 spray into each nostril twice a day     levonorgestrel (MIRENA) 52 MG (20 mcg/day) IUD     metroNIDAZOLE (METROGEL) 0.75 % vaginal gel     Sig: INSERT 1 APPLICATORFUL VAGINALLY EVERY DAY AT NIGHT     WEGOVY 2.4 MG/0.75ML pen     Sig: INJECT 0.75ML (2.4 MG) UNDER THE SKIN ONCE WEEKLY. DO NOT START BEFORE MAY 8

## 2023-07-10 NOTE — PATIENT INSTRUCTIONS
Ciprodex (4 drops) to LEFT ear 2x daily for 5 days    Return visit 4-6 months with audiogram and CT review

## 2023-09-20 ENCOUNTER — APPOINTMENT (OUTPATIENT)
Dept: CT IMAGING | Facility: CLINIC | Age: 33
End: 2023-09-20
Attending: STUDENT IN AN ORGANIZED HEALTH CARE EDUCATION/TRAINING PROGRAM
Payer: COMMERCIAL

## 2023-09-20 ENCOUNTER — NURSE TRIAGE (OUTPATIENT)
Dept: NURSING | Facility: CLINIC | Age: 33
End: 2023-09-20
Payer: COMMERCIAL

## 2023-09-20 ENCOUNTER — HOSPITAL ENCOUNTER (EMERGENCY)
Facility: CLINIC | Age: 33
Discharge: HOME | End: 2023-09-20
Attending: STUDENT IN AN ORGANIZED HEALTH CARE EDUCATION/TRAINING PROGRAM | Admitting: STUDENT IN AN ORGANIZED HEALTH CARE EDUCATION/TRAINING PROGRAM
Payer: COMMERCIAL

## 2023-09-20 VITALS
DIASTOLIC BLOOD PRESSURE: 85 MMHG | RESPIRATION RATE: 16 BRPM | BODY MASS INDEX: 26.66 KG/M2 | HEIGHT: 65 IN | HEART RATE: 74 BPM | OXYGEN SATURATION: 96 % | WEIGHT: 160 LBS | TEMPERATURE: 98.4 F | SYSTOLIC BLOOD PRESSURE: 135 MMHG

## 2023-09-20 DIAGNOSIS — H20.9 TRAUMATIC IRITIS: ICD-10-CM

## 2023-09-20 PROCEDURE — 70480 CT ORBIT/EAR/FOSSA W/O DYE: CPT

## 2023-09-20 PROCEDURE — 70480 CT ORBIT/EAR/FOSSA W/O DYE: CPT | Mod: 26 | Performed by: RADIOLOGY

## 2023-09-20 PROCEDURE — 250N000011 HC RX IP 250 OP 636: Performed by: STUDENT IN AN ORGANIZED HEALTH CARE EDUCATION/TRAINING PROGRAM

## 2023-09-20 PROCEDURE — 250N000013 HC RX MED GY IP 250 OP 250 PS 637: Performed by: STUDENT IN AN ORGANIZED HEALTH CARE EDUCATION/TRAINING PROGRAM

## 2023-09-20 PROCEDURE — 99284 EMERGENCY DEPT VISIT MOD MDM: CPT | Mod: 25 | Performed by: STUDENT IN AN ORGANIZED HEALTH CARE EDUCATION/TRAINING PROGRAM

## 2023-09-20 PROCEDURE — 250N000009 HC RX 250: Performed by: STUDENT IN AN ORGANIZED HEALTH CARE EDUCATION/TRAINING PROGRAM

## 2023-09-20 PROCEDURE — 99285 EMERGENCY DEPT VISIT HI MDM: CPT | Performed by: STUDENT IN AN ORGANIZED HEALTH CARE EDUCATION/TRAINING PROGRAM

## 2023-09-20 RX ORDER — TETRACAINE HYDROCHLORIDE 5 MG/ML
1-2 SOLUTION OPHTHALMIC ONCE
Status: COMPLETED | OUTPATIENT
Start: 2023-09-20 | End: 2023-09-20

## 2023-09-20 RX ORDER — ONDANSETRON 4 MG/1
4 TABLET, ORALLY DISINTEGRATING ORAL ONCE
Status: COMPLETED | OUTPATIENT
Start: 2023-09-20 | End: 2023-09-20

## 2023-09-20 RX ORDER — ACETAMINOPHEN 500 MG
1000 TABLET ORAL ONCE
Status: COMPLETED | OUTPATIENT
Start: 2023-09-20 | End: 2023-09-20

## 2023-09-20 RX ORDER — ATROPINE SULFATE 10 MG/ML
1-2 SOLUTION/ DROPS OPHTHALMIC AT BEDTIME
Qty: 2 ML | Refills: 0 | Status: SHIPPED | OUTPATIENT
Start: 2023-09-20 | End: 2023-09-28

## 2023-09-20 RX ORDER — PREDNISOLONE ACETATE 10 MG/ML
1-2 SUSPENSION/ DROPS OPHTHALMIC 4 TIMES DAILY
Qty: 10 ML | Refills: 0 | Status: SHIPPED | OUTPATIENT
Start: 2023-09-20 | End: 2023-09-28

## 2023-09-20 RX ORDER — IBUPROFEN 600 MG/1
600 TABLET, FILM COATED ORAL ONCE
Status: COMPLETED | OUTPATIENT
Start: 2023-09-20 | End: 2023-09-20

## 2023-09-20 RX ADMIN — TETRACAINE HYDROCHLORIDE 1 DROP: 5 SOLUTION OPHTHALMIC at 19:37

## 2023-09-20 RX ADMIN — ONDANSETRON 4 MG: 4 TABLET, ORALLY DISINTEGRATING ORAL at 14:33

## 2023-09-20 RX ADMIN — IBUPROFEN 600 MG: 600 TABLET ORAL at 14:33

## 2023-09-20 RX ADMIN — ACETAMINOPHEN 1000 MG: 500 TABLET ORAL at 14:32

## 2023-09-20 RX ADMIN — FLUORESCEIN SODIUM 1 STRIP: 1 STRIP OPHTHALMIC at 14:33

## 2023-09-20 ASSESSMENT — ACTIVITIES OF DAILY LIVING (ADL)
ADLS_ACUITY_SCORE: 33

## 2023-09-20 NOTE — TELEPHONE ENCOUNTER
Patient was hit in the face by a golf ball on Saturday. The left eye was hit at the bottom corner on the outside of her left eye. Patient did not have any immediate symptoms except for being stunned. Patient iced right away and then bruising started. Patient has had a headache since. Symptoms have been worsening and today is having some dizziness, brain fog, floaters in her eye and nausea  Patient also reports left side neck pain.  Protocol recommends ED now  Patient states she will go to [facility] ED  Encouraged another adult to drive is having dizziness or vision changes.   Yelena Fraser RN   09/20/23 12:34 PM  [facility] Nurse Advisor    Reason for Disposition   Neck pain    Additional Information   Negative: Major bleeding (actively dripping or spurting) that can't be stopped   Negative: Bullet, knife or other serious penetrating wound   Negative: Difficulty breathing or choking   Negative: Knocked out (unconscious) > 1 minute   Negative: Difficult to awaken or acting confused (e.g., disoriented, slurred speech)   Negative: Severe neck pain   Negative: Sounds like a life-threatening emergency to the triager   Negative: Head or forehead injury is main concern   Negative: Neck injury is main concern   Negative: Eye or orbit injury is main concern   Negative: Ear injury is main concern   Negative: Mouth injury is main concern   Negative: Nose injury is main concern   Negative: Teeth or tooth injury is main concern   Negative: Wound looks infected   Negative: Bleeding won't stop after 10 minutes of direct pressure (using correct technique)   Negative: Skin is split open or gaping (or length > 1/4 inch or 6 mm)   Negative: Crooked face or smile   Negative: Looks like a broken bone (e.g., cheekbone is flat on one side)   Negative: Can't fully open or close the mouth    Protocols used: Face Injury-A-OH

## 2023-09-20 NOTE — CONSULTS
"  OPHTHALMOLOGY CONSULT NOTE  09/20/23    Patient: Arabella Friend  Consulted by: ED Dr. Haji  Reason for Consult: Blunt eye trauma, photophobia, visual disturbances    HISTORY OF PRESENTING ILLNESS:     Arabella Friend is a 32 year old female who presents after a golf ball injury to the left eye which occurred on Saturday 9/16. She was at a wedding when a stray golf ball hit her left eye. She denies LOC. She initially used ice for the swelling which helped. However over the next few days she began to develop headaches, dizziness, nausea, and persistent eye pain with photophobia. She also noticed visual disturbances that she describes as \"like an optical illusion in the left eye.\" She denies flashes of light, new floaters, curtain of decreased vision, discharge, or other symptoms. Her reason for presenting to the ED was due to the worsening eye pain and headache with associated nausea.     Review of systems were otherwise negative except for that which has been stated above.      OCULAR/MEDICAL/SURGICAL HISTORIES:     Past Ocular History:  Last eye exam: 4/3/2023 - [facility]  Prior eye surgery/laser: none  Contact lens wear: yes, monthly's   Glasses: yes  Eyedrops: none    Family History:  No fmhx of eye disease including glaucoma or retinal detachments    Social History:  Social History     Tobacco Use    Smoking status: Never    Smokeless tobacco: Never   Substance Use Topics    Alcohol use: Yes    Drug use: Yes     Types: Marijuana       History reviewed. No pertinent past medical history.    Past Surgical History:   Procedure Laterality Date    ORTHOPEDIC SURGERY         EXAMINATION:     Base Eye Exam       Visual Acuity (Snellen - Linear)         Right Left    Near cc 20/20 20/20              Tonometry (Tonopen, 5:14 PM)         Right Left    Pressure 12 13              Pupils         Pupils APD    Right PERRL None    Left PERRL None              Visual Fields         Left Right     Full Full         "      Extraocular Movement         Right Left     Full, Ortho Full, Ortho              Neuro/Psych       Oriented x3: Yes    Mood/Affect: Normal              Dilation       Both eyes: 1.0% Mydriacyl, 2.5% Stan Synephrine @ 5:14 PM                  Additional Tests       Color         Right Left    Ishihara 16/16 16/16                  Slit Lamp and Fundus Exam       External Exam         Right Left    External Normal TTP of lateral orbital rim. Normal sensation V1 and V2 distributions.              Slit Lamp Exam         Right Left    Lids/Lashes Normal Upper lid echymosis    Conjunctiva/Sclera White and quiet Temporal injection. No VIPIN.    Cornea Clear Clear    Anterior Chamber Deep and quiet Trace-1+ WBC, deep. No hyphema or microhyphema.    Iris Round and reactive --> pharm dialted Round and reactive, no TID. --> pharm dilated    Lens Clear Clear    Anterior Vitreous Normal Normal, no heme/cell/pigment              Fundus Exam         Right Left    Disc Normal Normal    C/D Ratio 0.2 0.2    Macula Normal Normal, no commotio    Vessels Normal Normal, no hemorrhages    Periphery Normal Normal, no tears or breaks                    Labs/Studies/Imaging Performed:  CT orbits w/o contrast 09/20/23   - Personal review does not reveal any orbital fractures. Globe intact. No acute findings.   - Awaiting official radiology read     ASSESSMENT/PLAN:     Arabella Friend is a 32 year old female who presents with     # Traumatic iritis, left eye  # Blunt trauma, left eye and lateral periorbit  -Date of injury: 9/16/23  -Mechanism of injury: golf ball  -Visual acuity 20/20. Pupil exam normal, without APD. Intraocular pressure normal 13/12.  -Extraocular motility full. Patient does experience pain and nausea with eye movements, worst with adduction and supraduction. No evidence of non-resolving bradycardia, heart block, or syncope.  -Anterior exam notable for 1+ WBC in the anterior chamber, otherwise unremarkable;  non-circumferential subconjunctival hemorrhage, clear cornea, formed anterior chamber, round pupil. Dilated exam unremarkable.  -CT scan without contrast (09/20/23) shows no fractures on personal review (awaiting radiologist's read)      RECOMMENDATIONS:  - If official radiology read reveals orbital fracture or other orbital pathology, please contact me via pager at [phone] to discuss change in management.   - Recommend evaluation and treatment of TBI per the ED team  - Start Atropine nightly, left eye  - Start Prednisolone QID, left eye  - Continue ice-pack to the left eye for comfort and swelling as needed  - Follow up in [facility] in 5-7 days or sooner as needed. Discussed return precautions. Our clinic will arrange scheduling.      It is our pleasure to participate in this patient's care and treatment. Please contact us with any further questions or concerns.      Yasmany Chamorro MD  Resident Physician, PGY-2  Department of Ophthalmology

## 2023-09-20 NOTE — ED TRIAGE NOTES
"Pt was hit in face with golf ball Saturday around 4PM. Pt did not pass out but has been experiencing dizziness, fatigue, brain fog since. Today, pt having nausea and vision changes--\"It feels like an optical illusion looking out that eye\".     Triage Assessment       Row Name 09/20/23 1344       Triage Assessment (Adult)    Airway WDL WDL       Respiratory WDL    Respiratory WDL WDL       Skin Circulation/Temperature WDL    Skin Circulation/Temperature WDL WDL       Cardiac WDL    Cardiac WDL WDL       Peripheral/Neurovascular WDL    Peripheral Neurovascular WDL WDL       Cognitive/Neuro/Behavioral WDL    Cognitive/Neuro/Behavioral WDL X    Level of Consciousness lethargic       Pupils (CN II)    Pupil PERRLA yes    Pupil Size Left 5 mm    Pupil Size Right 5 mm                    "

## 2023-09-20 NOTE — ED PROVIDER NOTES
"ED Provider Note  [facility]      History     Chief Complaint   Patient presents with     Eye Injury     HPI     Arabella Friend is a 32 year old female who wears glasses and contacts, who was struck in the left orbital rim by a golf ball while attending a wedding on Saturday. She was wearing contacts at the time. She applied ice right away, took ibuprofen. Denies loss of consciousness, she did not fall. Describes swelling to the left eyelid that partially occluded the eye, however she denies any immediate visual deficit or disturbance.     Today she developed visual floaters in the left eye that appear to be traveling away. They are described as darker spots. The disturbance comes and goes. She describes mild \"haziness\" to the left visual fields. The pain is also worse around the contusion. She also has developed mild nausea and headache.              Physical Exam   BP: 135/85  Pulse: 74  Temp: 98.4  F (36.9  C)  Resp: 16  Height: 165.1 cm (5' 5\")  Weight: 72.6 kg (160 lb)  SpO2: 96 %  Physical Exam    Vital signs reviewed.  GENERAL: Alert. Conversant.  EYES: PERRL. Conjunctiva normal. Lids symmetric but left  eye has a contusion over the superior lateral left lid.  HEAD: Nose and ears atraumatic. Mucous membranes moist.  NECK: Symmetric. Atraumatic.  CARDIAC: Normal peripheral pulses. No lower extremity edema.  RESPIRATORY: Normal respiratory effort. No audible stridor or wheezing.  ABDOMINAL: Soft, Nontender.  SKIN: Warm, Dry. No visualized rashes or lesions.  NEURO: CNIII-XII grossly intact.  PSYCH: Oriented to person, place, and time. Mood and affect appropriate and congruent.      ED Course, Procedures, & Data     ED Course as of 09/25/23 2216   Wed Sep 20, 2023   1732 Optho requested CT orbits.     Procedures                      Results for orders placed or performed during the hospital encounter of 09/20/23   CT Orbits wo Contrast     Status: None    Narrative    EXAM: CT ORBITAL " W/O CONTRAST  9/20/2023 7:21 PM     HISTORY:  Hit with golf ball left orbital rim. Eval for entrapment       COMPARISON: None available      TECHNIQUE: Using thin collimation multidetector helical acquisition  technique, axial, coronal, sagittal CT images were obtained through  the orbits and upper facial bones, following intravenous  administration of nonionic iodinated contrast medium.    CONTRAST: None    FINDINGS:   No preseptal, extraconal, or intraconal edema. No abnormal  enhancement. No intraorbital mass or hematoma. Intact globes  bilaterally. No proptosis.     No orbital wall fracture. Clear paranasal sinuses and mastoid air  cells. Intact cribriform plate. Visualized dentition is within normal  limits.    No focal abnormality of the visualized intracranial structures.      Impression    IMPRESSION: Normal CT study of the orbits.    I have personally reviewed the examination and initial interpretation  and I agree with the findings.    EILEEN TOM MD         SYSTEM ID:  [id number]     Medications   tetracaine (PONTOCAINE) 0.5 % ophthalmic solution 1-2 drop (1 drop Both Eyes $Given by Other 9/20/23 1937)   fluorescein (FUL-MIREYA) ophthalmic strip 1 strip (1 strip Both Eyes $Given 9/20/23 1433)   acetaminophen (TYLENOL) tablet 1,000 mg (1,000 mg Oral $Given 9/20/23 1432)   ibuprofen (ADVIL/MOTRIN) tablet 600 mg (600 mg Oral $Given 9/20/23 1433)   ondansetron (ZOFRAN ODT) ODT tab 4 mg (4 mg Oral $Given 9/20/23 1433)     Labs Ordered and Resulted from Time of ED Arrival to Time of ED Departure - No data to display  CT Orbits wo Contrast   Final Result   IMPRESSION: Normal CT study of the orbits.      I have personally reviewed the examination and initial interpretation   and I agree with the findings.      EILEEN TOM MD            SYSTEM ID:  [id number]             Critical care was not performed.     Medical Decision Making  The patient's presentation was of high complexity (an acute health issue posing  potential threat to life or bodily function).    The patient's evaluation involved:  discussion of management or test interpretation with another health professional (see separate area of note for details)    The patient's management necessitated only low risk treatment.      Assessment & Plan    Presentation was concerning for severe mechanism of injury with focal head trauma to orbital rim and possibility of globe involvement. No limits to EOM on exam, however, given reported visual deficits I consulted ophthalmology, who requested CT scan of the orbits. Upon receipt of final imaging results and thorough optho exam, patient was diagnosed with traumatic iritis and discharged with atropine, prednisolone, and instructed to follow up with eye clinic in 5-7 days. Optho reports they will schedule the appointment for her.     Patient certainly has sustained a TBI and some degree of concussion. Educated about post-concussive precautions and discharged.    I have reviewed the nursing notes. I have reviewed the findings, diagnosis, plan and need for follow up with the patient.    Discharge Medication List as of 9/20/2023  7:37 PM      START taking these medications    Details   atropine 1 % ophthalmic solution Place 1-2 drops Into the left eye At Bedtime for 7 days, Disp-2 mL, R-0, E-Prescribe      prednisoLONE acetate (PRED FORTE) 1 % ophthalmic suspension Place 1-2 drops Into the left eye 4 times daily for 7 days, Disp-10 mL, R-0, E-Prescribe             Final diagnoses:   Traumatic iritis       Paras Haji MD  [facility] EMERGENCY DEPARTMENT  9/20/2023     Paras Haji MD  09/25/23 9403

## 2023-09-21 ENCOUNTER — TELEPHONE (OUTPATIENT)
Dept: OPHTHALMOLOGY | Facility: CLINIC | Age: 33
End: 2023-09-21
Payer: COMMERCIAL

## 2023-09-21 NOTE — TELEPHONE ENCOUNTER
Please schedule for an undilated follow up visit for traumatic iritis in 5-7 days (early next week). No special testing     Above per on call eye provider    Clinic to reach out for scheduling    Agustín Avelar RN 6:58 AM 09/21/23

## 2023-09-25 ENCOUNTER — TELEPHONE (OUTPATIENT)
Dept: OPHTHALMOLOGY | Facility: CLINIC | Age: 33
End: 2023-09-25
Payer: COMMERCIAL

## 2023-09-25 NOTE — TELEPHONE ENCOUNTER
[facility] Call [facility]    Phone Message    May a detailed message be left on voicemail: yes     Reason for Call: Other: pt is returning a call back from end of last week. Per encounters and pt acute visit ER follow up from 9/20/23 needed. Writer unable to schedule acute visits per guidelines. Please contact pt to schedule. Thank you      Action Taken: Message routed to:  [facility] eye    Travel Screening: Not Applicable

## 2023-09-28 ENCOUNTER — OFFICE VISIT (OUTPATIENT)
Dept: OPHTHALMOLOGY | Facility: CLINIC | Age: 33
End: 2023-09-28
Attending: OPHTHALMOLOGY
Payer: COMMERCIAL

## 2023-09-28 DIAGNOSIS — H21.02 HYPHEMA, LEFT EYE: ICD-10-CM

## 2023-09-28 DIAGNOSIS — H20.9 TRAUMATIC IRITIS: Primary | ICD-10-CM

## 2023-09-28 PROCEDURE — 99204 OFFICE O/P NEW MOD 45 MIN: CPT | Mod: GC | Performed by: OPHTHALMOLOGY

## 2023-09-28 PROCEDURE — G0463 HOSPITAL OUTPT CLINIC VISIT: HCPCS

## 2023-09-28 ASSESSMENT — CONF VISUAL FIELD
METHOD: COUNTING FINGERS
OD_NORMAL: 1
OS_INFERIOR_TEMPORAL_RESTRICTION: 0
OS_SUPERIOR_NASAL_RESTRICTION: 0
OD_SUPERIOR_NASAL_RESTRICTION: 0
OS_INFERIOR_NASAL_RESTRICTION: 0
OD_SUPERIOR_TEMPORAL_RESTRICTION: 0
OD_INFERIOR_TEMPORAL_RESTRICTION: 0
OS_NORMAL: 1
OD_INFERIOR_NASAL_RESTRICTION: 0
OS_SUPERIOR_TEMPORAL_RESTRICTION: 0

## 2023-09-28 ASSESSMENT — TONOMETRY
OS_IOP_MMHG: 22
IOP_METHOD: TONOPEN
OD_IOP_MMHG: 16
OS_IOP_MMHG: 16
IOP_METHOD: APPLANATION
OD_IOP_MMHG: 19

## 2023-09-28 ASSESSMENT — VISUAL ACUITY
OS_CC: 20/25
OS_PH_CC: 20/20
METHOD: SNELLEN - LINEAR
OD_CC: 20/20
CORRECTION_TYPE: GLASSES
OS_CC+: -1
OS_PH_CC+: -3

## 2023-09-28 ASSESSMENT — EXTERNAL EXAM - LEFT EYE: OS_EXAM: NORMAL

## 2023-09-28 ASSESSMENT — SLIT LAMP EXAM - LIDS
COMMENTS: NORMAL
COMMENTS: NORMAL

## 2023-09-28 ASSESSMENT — EXTERNAL EXAM - RIGHT EYE: OD_EXAM: NORMAL

## 2023-09-28 ASSESSMENT — CUP TO DISC RATIO: OS_RATIO: 0.2

## 2023-09-28 NOTE — NURSING NOTE
Chief Complaints and History of Present Illnesses   Patient presents with    Follow Up     traumatic iritis     Chief Complaint(s) and History of Present Illness(es)       Follow Up              Laterality: left eye    Course: gradually improving    Associated symptoms: headache and photophobia.  Negative for eye pain and redness    Treatments tried: eye drops    Pain scale: 0/10    Comments: traumatic iritis              Comments    Patient was seen in the ED on 09/20/2023 and diagnosed with traumatic iritis in her left eye (after being hit by a golf ball).  Since the injury she has been having daily headaches.  Taking Ibuprofen makes the headaches manageable.  She thinks the headaches are occurring because the eye is dilated.    She is using:  Atropine at night in left eye   Prednisolone 4x a day left eye     NETTE Najera 10:32 AM  September 28, 2023

## 2023-09-28 NOTE — PATIENT INSTRUCTIONS
Please decrease the prednisolone forte in the following manner:    - Please decrease to THREE TIMES DAILY for 3 days  - THEN please decrease to TWO TIMES DAILY for 3 days  - THEN please decrease to ONCE DAILY for 3 days  - THEN STOP     Please STOP the atropine ()

## 2023-09-28 NOTE — PROGRESS NOTES
HPI       Follow Up    In left eye.  Since onset it is gradually improving.  Associated symptoms include headache and photophobia.  Negative for eye pain and redness.  Treatments tried include eye drops.  Pain was noted as 0/10. Additional comments: traumatic iritis             Comments    Patient was seen in the ED on 09/20/2023 and diagnosed with traumatic iritis in her left eye (after being hit by a golf ball).  Since the injury she has been having daily headaches.  Taking Ibuprofen makes the headaches manageable.  She thinks the headaches are occurring because the eye is dilated.    She is using:  Atropine at night in left eye   Prednisolone 4x a day left eye     NETTE Najera 10:32 AM  September 28, 2023               Last edited by Chasidy Vaughan COT on 9/28/2023 10:36 AM.          [facility]     HPI:   Arabella Friend is a 32 year old female who presents for ED follow up of traumatic iritis after being hit in the left eye with a golf ball.     Patient states that she is doing well over all. States eye pain has largely resolved. Has been using PF QID and atropine daily with no issues.     Past Ocular history:   - Medical history: Refractive error  - Surgical history: Denies  - Glasses: Yes  - Contact lens wear: Denies  - Current Eye drops: PF QID, atropine daily to the left eye    PMH:     History reviewed. No pertinent past medical history.      FH: No glaucoma or AMD.     Review of systems for the eyes was negative other than the pertinent positives/negatives listed in the HPI.      Imaging:       Assessment & Plan      Arabella Friend is a 32 year old female with the following diagnoses:   1. Traumatic iritis    2. Hyphema, left eye       # Traumatic iritis, left eye  - Tr WBC on exam today     # Microhyphema, left eye  - 2+ RBC seen in the AC today   - No layered hyphema     Plan  - Start PF taper - decrease to TID for 3 days, BID for 3 days, and once daily for 3 days then stop  -  Stop atropine   - Start artificial tears in both eyes       Patient disposition:   Return in about 10 days (around 10/8/2023) for Follow Up, [place] for iritis .    Patient seen with Dr. Curt Catherine MD  Resident Physician, PGY-2  Department of Ophthalmology  09/28/23 10:31 AM    Attending Physician Attestation:  Complete documentation of historical and exam elements from today's encounter can be found in the full encounter summary report (not reduplicated in this progress note).  I personally obtained the chief complaint(s) and history of present illness.  I confirmed and edited as necessary the review of systems, past medical/surgical history, family history, social history, and examination findings as documented by others; and I examined the patient myself.  I personally reviewed the relevant tests, images, and reports as documented above.  I formulated and edited as necessary the assessment and plan and discussed the findings and management plan with the patient and family. . - Nikita Elias MD

## 2023-10-09 ENCOUNTER — OFFICE VISIT (OUTPATIENT)
Dept: OPHTHALMOLOGY | Facility: CLINIC | Age: 33
End: 2023-10-09
Attending: STUDENT IN AN ORGANIZED HEALTH CARE EDUCATION/TRAINING PROGRAM
Payer: COMMERCIAL

## 2023-10-09 DIAGNOSIS — H52.13 MYOPIA OF BOTH EYES WITH ASTIGMATISM: ICD-10-CM

## 2023-10-09 DIAGNOSIS — H52.203 MYOPIA OF BOTH EYES WITH ASTIGMATISM: ICD-10-CM

## 2023-10-09 DIAGNOSIS — H21.02 HYPHEMA, LEFT EYE: ICD-10-CM

## 2023-10-09 DIAGNOSIS — H20.9 TRAUMATIC IRITIS: Primary | ICD-10-CM

## 2023-10-09 PROCEDURE — 99213 OFFICE O/P EST LOW 20 MIN: CPT | Mod: GC | Performed by: STUDENT IN AN ORGANIZED HEALTH CARE EDUCATION/TRAINING PROGRAM

## 2023-10-09 PROCEDURE — 92020 GONIOSCOPY: CPT | Mod: GC | Performed by: STUDENT IN AN ORGANIZED HEALTH CARE EDUCATION/TRAINING PROGRAM

## 2023-10-09 PROCEDURE — 99214 OFFICE O/P EST MOD 30 MIN: CPT

## 2023-10-09 PROCEDURE — 92020 GONIOSCOPY: CPT

## 2023-10-09 ASSESSMENT — CONF VISUAL FIELD
OS_INFERIOR_TEMPORAL_RESTRICTION: 0
OD_INFERIOR_NASAL_RESTRICTION: 0
OS_INFERIOR_NASAL_RESTRICTION: 0
OD_NORMAL: 1
OD_INFERIOR_TEMPORAL_RESTRICTION: 0
OD_SUPERIOR_TEMPORAL_RESTRICTION: 0
OD_SUPERIOR_NASAL_RESTRICTION: 0
OS_NORMAL: 1
METHOD: COUNTING FINGERS
OS_SUPERIOR_TEMPORAL_RESTRICTION: 0
OS_SUPERIOR_NASAL_RESTRICTION: 0

## 2023-10-09 ASSESSMENT — REFRACTION_WEARINGRX
OS_CYLINDER: +1.75
OD_AXIS: 165
OD_SPHERE: -2.50
OS_SPHERE: -2.50
OS_AXIS: 170
SPECS_TYPE: SVL
OD_CYLINDER: +2.00

## 2023-10-09 ASSESSMENT — TONOMETRY
OS_IOP_MMHG: 17
OD_IOP_MMHG: 16
IOP_METHOD: ICARE

## 2023-10-09 ASSESSMENT — VISUAL ACUITY
OD_CC: 20/20
OS_CC: 20/20
CORRECTION_TYPE: GLASSES
METHOD: SNELLEN - LINEAR

## 2023-10-09 ASSESSMENT — SLIT LAMP EXAM - LIDS
COMMENTS: NORMAL
COMMENTS: NORMAL

## 2023-10-09 ASSESSMENT — EXTERNAL EXAM - RIGHT EYE: OD_EXAM: NORMAL

## 2023-10-09 ASSESSMENT — EXTERNAL EXAM - LEFT EYE: OS_EXAM: NORMAL

## 2023-10-09 NOTE — PROGRESS NOTES
Ophthalmology Acute Clinic     [facility]       Iritis Follow Up    In left eye.  Since onset it is gradually improving.  Associated symptoms include Negative for double vision, redness and photophobia.  Treatments tried include eye drops.  Response to treatment was significant improvement. Additional comments: Stopped pred forte and atropine. VA seems normal. Pain of LE is improving. Photophobia has improved since stopping atropine. Since yesterday left eye looks about normal.             Last edited by Ksenia Puente CO on 10/9/2023 11:15 AM.          HPI:   Arabella Friend is a 32 year old female who presents for follow up of traumatic iritis/hyphema of left eye after being hit in the left eye with a golf ball.     Patient states that she is doing well over all. States eye pain has resolved. Has stopped using both atropine and PF since last visit.    Past Ocular history:   - Medical history: Refractive error  - Surgical history: Denies  - Glasses: Yes  - Contact lens wear: Denies  - Current Eye drops: none    PMHx: History reviewed. No pertinent past medical history.        FOHx: No glaucoma or AMD.     Review of systems for the eyes was negative other than the pertinent positives/negatives listed in the HPI.        Assessment & Plan      Arabella Friend is a 32 year old female with the following diagnoses:     1. Traumatic iritis - Left Eye    2. Hyphema, left eye    3. Myopia of both eyes with astigmatism        # Traumatic iritis left eye, resolved  # Microhyphema left eye, resolved    - here for follow up of traumatic iritis/hyphema left eye; doing well off all drops  - visual acuity excellent, IOP wnl, no RAPD OU  - AC deep and quiet without WBC or RBC today; few TIDs OU, not consistent with PDS, does have thin/blue iris OU  - undilated gonioscopy with 4-mirror open to cbb with no angle recession 360 OU  - Discussed increased risk of glaucoma following traumatic injury    Plan  - Start artificial tears in both eyes  PRN  - return precautions reviewed    Myopia of both eyes with astigmatism  - excellent BCVA, observe    Counseled return/RD precautions    Patient disposition:   Return in about 6 months (around 4/9/2024) for Annual Visit, or sooner changes.    Patient seen with Dr. Miley Chamorro MD  Resident Physician, PGY-2  Department of Ophthalmology     Attending Physician Attestation:  Complete documentation of historical and exam elements from today's encounter can be found in the full encounter summary report (not reduplicated in this progress note).  I personally obtained the chief complaint(s) and history of present illness.  I confirmed and edited as necessary the review of systems, past medical/surgical history, family history, social history, and examination findings as documented by others; and I examined the patient myself.  I personally reviewed the relevant tests, images, and reports as documented above.  I formulated and edited as necessary the assessment and plan and discussed the findings and management plan with the patient and family. . - Emy Trent MD

## 2023-10-10 ASSESSMENT — GONIOSCOPY
OD_INFERIOR: CBB
OD_NASAL: CBB
OS_INFERIOR: CBB
METHOD: 4-MIRROR
OS_TEMPORAL: CBB
OS_NASAL: CBB
OD_SUPERIOR: CBB
OS_SUPERIOR: CBB
OD_TEMPORAL: CBB

## 2024-01-26 ENCOUNTER — OFFICE VISIT (OUTPATIENT)
Dept: AUDIOLOGY | Facility: CLINIC | Age: 34
End: 2024-01-26
Payer: COMMERCIAL

## 2024-01-26 ENCOUNTER — OFFICE VISIT (OUTPATIENT)
Dept: OTOLARYNGOLOGY | Facility: CLINIC | Age: 34
End: 2024-01-26
Payer: COMMERCIAL

## 2024-01-26 DIAGNOSIS — Z01.10 NORMAL HEARING NOTED ON EXAMINATION: Primary | ICD-10-CM

## 2024-01-26 DIAGNOSIS — H93.12 TINNITUS, LEFT: ICD-10-CM

## 2024-01-26 DIAGNOSIS — H93.12 TINNITUS OF LEFT EAR: Primary | ICD-10-CM

## 2024-01-26 PROCEDURE — 92567 TYMPANOMETRY: CPT | Performed by: AUDIOLOGIST

## 2024-01-26 PROCEDURE — 99214 OFFICE O/P EST MOD 30 MIN: CPT | Performed by: OTOLARYNGOLOGY

## 2024-01-26 PROCEDURE — 92557 COMPREHENSIVE HEARING TEST: CPT | Performed by: AUDIOLOGIST

## 2024-01-26 NOTE — PROGRESS NOTES
AUDIOLOGY REPORT     SUMMARY: Audiology visit completed. See audiogram for results.       RECOMMENDATIONS: Follow-up with ENT.    Jeanette Gutiérrez, CCC-A  [place] Licensed Audiologist #[id number]

## 2024-01-26 NOTE — PROGRESS NOTES
CHIEF COMPLAINT:  Patient presents with:  Ent Problem: epiDisc placement dping great in left ear, no pain, no drainage, no infection.         HISTORY OF PRESENT ILLNESS    Arabella was seen in follow up after previous 7/10/2023 visit for recheck ear and audiogram review.   No further ear infections.   She has intermittent?  tinnitus in the left ear.  No dizziness.     No hearing concerns.           MY PREVIOUS NOTE:    Encounter Diagnoses   Name Primary?    Perforation of tympanic membrane, left Yes    Acute recurrent sinusitis, unspecified location      Keratin debris of ear canal, left                 RECOMMENDATIONS:         Orders Placed This Encounter   Procedures    [place] EAR DEBRIDEMENT    CT Sinus w/o Contrast           Orders Placed This Encounter   Medications    cefdinir (OMNICEF) 300 MG capsule       Sig: Take 300 mg by mouth 2 times daily    cholecalciferol (VITAMIN D3) 10 mcg (400 units) TABS tablet       Sig: Take 10 mcg by mouth    fluticasone (FLONASE) 50 MCG/ACT nasal spray       Sig: spray 1 spray into each nostril twice a day    levonorgestrel (MIRENA) 52 MG (20 mcg/day) IUD    metroNIDAZOLE (METROGEL) 0.75 % vaginal gel       Sig: INSERT 1 APPLICATORFUL VAGINALLY EVERY DAY AT NIGHT    WEGOVY 2.4 MG/0.75ML pen       Sig: INJECT 0.75ML (2.4 MG) UNDER THE SKIN ONCE WEEKLY. DO NOT START BEFORE MAY 8         REVIEW OF SYSTEMS    Review of Systems: a 10-system review is reviewed at this encounter.  See scanned document.       Allergies   Allergen Reactions    Codeine Nausea and Vomiting    Hydrocodone-Acetaminophen GI Disturbance and Nausea    Latex     Vicodin [Hydrocodone-Acetaminophen]     Petrolatum-Zinc Oxide Rash           PHYSICAL EXAM:        HEAD: Normal appearance and symmetry:  No cutaneous lesions.      EARS:        RIGHT: TM intact nl    LEFT:    TM intact nl   NOSE:    Dorsum:   straight       ORAL CAVITY/OROPHARYNX:    Lips:  Normal.     NECK:  Trachea:  midline     NEURO:   Alert and  Oriented    GAIT AND STATION:  normal     RESPIRATORY:   Symmetry and Respiratory effort    PSYCH:   normal mood and affect    SKIN:  warm and dry     AUDIOGRAM:  wnl     IMPRESSION:   Encounter Diagnosis   Name Primary?    Normal hearing noted on examination Yes       RECOMMENDATIONS:    Orders Placed This Encounter   Procedures    Adult Audiology  Referral      Results via [facility]

## 2024-01-26 NOTE — LETTER
1/26/2024         RE: Arabella Friend  [address] [facility] [id number]        Dear Colleague,    Thank you for referring your patient, Arabella Friend, to the [facility]. Please see a copy of my visit note below.    CHIEF COMPLAINT:  Patient presents with:  Ent Problem: epiDisc placement dping great in left ear, no pain, no drainage, no infection.         HISTORY OF PRESENT ILLNESS    Arabella was seen in follow up after previous 7/10/2023 visit for recheck ear and audiogram review.   No further ear infections.   She has intermittent?  tinnitus in the left ear.  No dizziness.     No hearing concerns.           MY PREVIOUS NOTE:    Encounter Diagnoses   Name Primary?     Perforation of tympanic membrane, left Yes     Acute recurrent sinusitis, unspecified location       Keratin debris of ear canal, left                 RECOMMENDATIONS:         Orders Placed This Encounter   Procedures     [place] EAR DEBRIDEMENT     CT Sinus w/o Contrast           Orders Placed This Encounter   Medications     cefdinir (OMNICEF) 300 MG capsule       Sig: Take 300 mg by mouth 2 times daily     cholecalciferol (VITAMIN D3) 10 mcg (400 units) TABS tablet       Sig: Take 10 mcg by mouth     fluticasone (FLONASE) 50 MCG/ACT nasal spray       Sig: spray 1 spray into each nostril twice a day     levonorgestrel (MIRENA) 52 MG (20 mcg/day) IUD     metroNIDAZOLE (METROGEL) 0.75 % vaginal gel       Sig: INSERT 1 APPLICATORFUL VAGINALLY EVERY DAY AT NIGHT     WEGOVY 2.4 MG/0.75ML pen       Sig: INJECT 0.75ML (2.4 MG) UNDER THE SKIN ONCE WEEKLY. DO NOT START BEFORE MAY 8         REVIEW OF SYSTEMS    Review of Systems: a 10-system review is reviewed at this encounter.  See scanned document.       Allergies   Allergen Reactions     Codeine Nausea and Vomiting     Hydrocodone-Acetaminophen GI Disturbance and Nausea     Latex      Vicodin [Hydrocodone-Acetaminophen]      Petrolatum-Zinc Oxide Rash           PHYSICAL  EXAM:        HEAD: Normal appearance and symmetry:  No cutaneous lesions.      EARS:        RIGHT: TM intact nl    LEFT:    TM intact nl   NOSE:    Dorsum:   straight       ORAL CAVITY/OROPHARYNX:    Lips:  Normal.     NECK:  Trachea:  midline     NEURO:   Alert and Oriented    GAIT AND STATION:  normal     RESPIRATORY:   Symmetry and Respiratory effort    PSYCH:   normal mood and affect    SKIN:  warm and dry     AUDIOGRAM:  wnl     IMPRESSION:   Encounter Diagnosis   Name Primary?     Normal hearing noted on examination Yes       RECOMMENDATIONS:    Orders Placed This Encounter   Procedures     Adult Audiology  Referral      [facility]      Again, thank you for allowing me to participate in the care of your patient.        Sincerely,        Torito León MD

## 2024-02-18 ENCOUNTER — HEALTH MAINTENANCE LETTER (OUTPATIENT)
Age: 34
End: 2024-02-18

## 2025-03-09 ENCOUNTER — HEALTH MAINTENANCE LETTER (OUTPATIENT)
Age: 35
End: 2025-03-09